# Patient Record
Sex: FEMALE | Race: WHITE | NOT HISPANIC OR LATINO | Employment: OTHER | URBAN - METROPOLITAN AREA
[De-identification: names, ages, dates, MRNs, and addresses within clinical notes are randomized per-mention and may not be internally consistent; named-entity substitution may affect disease eponyms.]

---

## 2017-03-20 ENCOUNTER — CARE COORDINATION (OUTPATIENT)
Dept: CARDIOLOGY | Facility: CLINIC | Age: 72
End: 2017-03-20

## 2017-03-20 DIAGNOSIS — R93.3 ABNORMAL FINDINGS ON DIAGNOSTIC IMAGING OF OTHER PARTS OF DIGESTIVE TRACT: ICD-10-CM

## 2017-03-20 DIAGNOSIS — I42.8 NICM (NONISCHEMIC CARDIOMYOPATHY) (H): Primary | ICD-10-CM

## 2017-03-21 ENCOUNTER — PRE VISIT (OUTPATIENT)
Dept: CARDIOLOGY | Facility: CLINIC | Age: 72
End: 2017-03-21

## 2017-03-21 DIAGNOSIS — I50.22 CHRONIC SYSTOLIC HEART FAILURE (H): Primary | ICD-10-CM

## 2017-03-22 ENCOUNTER — OFFICE VISIT (OUTPATIENT)
Dept: CARDIOLOGY | Facility: CLINIC | Age: 72
End: 2017-03-22
Attending: INTERNAL MEDICINE
Payer: MEDICARE

## 2017-03-22 ENCOUNTER — RADIANT APPOINTMENT (OUTPATIENT)
Dept: CARDIOLOGY | Facility: CLINIC | Age: 72
End: 2017-03-22

## 2017-03-22 VITALS
SYSTOLIC BLOOD PRESSURE: 131 MMHG | HEIGHT: 65 IN | HEART RATE: 79 BPM | BODY MASS INDEX: 31.42 KG/M2 | DIASTOLIC BLOOD PRESSURE: 80 MMHG | OXYGEN SATURATION: 94 % | WEIGHT: 188.6 LBS

## 2017-03-22 DIAGNOSIS — R93.3 ABNORMAL FINDINGS ON DIAGNOSTIC IMAGING OF OTHER PARTS OF DIGESTIVE TRACT: ICD-10-CM

## 2017-03-22 DIAGNOSIS — I42.8 NICM (NONISCHEMIC CARDIOMYOPATHY) (H): ICD-10-CM

## 2017-03-22 DIAGNOSIS — I42.8 NON-ISCHEMIC CARDIOMYOPATHY (H): Primary | ICD-10-CM

## 2017-03-22 DIAGNOSIS — I50.22 CHRONIC SYSTOLIC HEART FAILURE (H): ICD-10-CM

## 2017-03-22 LAB
ALBUMIN SERPL-MCNC: 3.8 G/DL (ref 3.4–5)
ALP SERPL-CCNC: 56 U/L (ref 40–150)
ALT SERPL W P-5'-P-CCNC: 21 U/L (ref 0–50)
ANION GAP SERPL CALCULATED.3IONS-SCNC: 8 MMOL/L (ref 3–14)
AST SERPL W P-5'-P-CCNC: 12 U/L (ref 0–45)
BILIRUB SERPL-MCNC: 0.5 MG/DL (ref 0.2–1.3)
BUN SERPL-MCNC: 13 MG/DL (ref 7–30)
CALCIUM SERPL-MCNC: 8.8 MG/DL (ref 8.5–10.1)
CHLORIDE SERPL-SCNC: 106 MMOL/L (ref 94–109)
CHOLEST SERPL-MCNC: 149 MG/DL
CO2 SERPL-SCNC: 29 MMOL/L (ref 20–32)
CREAT SERPL-MCNC: 0.6 MG/DL (ref 0.52–1.04)
GFR SERPL CREATININE-BSD FRML MDRD: ABNORMAL ML/MIN/1.7M2
GLUCOSE SERPL-MCNC: 143 MG/DL (ref 70–99)
HDLC SERPL-MCNC: 52 MG/DL
LDLC SERPL CALC-MCNC: 42 MG/DL
NONHDLC SERPL-MCNC: 96 MG/DL
POTASSIUM SERPL-SCNC: 4.4 MMOL/L (ref 3.4–5.3)
PROT SERPL-MCNC: 6.8 G/DL (ref 6.8–8.8)
SODIUM SERPL-SCNC: 143 MMOL/L (ref 133–144)
TRIGL SERPL-MCNC: 273 MG/DL

## 2017-03-22 PROCEDURE — 99214 OFFICE O/P EST MOD 30 MIN: CPT | Mod: GC | Performed by: INTERNAL MEDICINE

## 2017-03-22 PROCEDURE — 36415 COLL VENOUS BLD VENIPUNCTURE: CPT | Performed by: INTERNAL MEDICINE

## 2017-03-22 PROCEDURE — 80061 LIPID PANEL: CPT | Performed by: INTERNAL MEDICINE

## 2017-03-22 PROCEDURE — 80053 COMPREHEN METABOLIC PANEL: CPT | Performed by: INTERNAL MEDICINE

## 2017-03-22 PROCEDURE — 99212 OFFICE O/P EST SF 10 MIN: CPT | Mod: ZF

## 2017-03-22 ASSESSMENT — PAIN SCALES - GENERAL: PAINLEVEL: NO PAIN (0)

## 2017-03-22 NOTE — NURSING NOTE
Cardiac Testing: Patient given instructions regarding  echocardiogram . Discussed purpose, preparation, procedure and when to expect results reported back to the patient. Patient demonstrated understanding of this information and agreed to call with further questions or concerns.    Med Reconcile: Reviewed and verified all current medications with the patient. The updated medication list was printed and given to the patient.    Return Appointment: Follow up with Dr Hinojosa in 2 years with an echo Patient given instructions regarding scheduling next clinic visit. Patient demonstrated understanding of this information and agreed to call with further questions or concerns.    Patient stated she understood all health information given and agreed to call with further questions or concerns.    [facility], RN  RN Care Coordinator  [facility]  [phone]

## 2017-03-22 NOTE — NURSING NOTE
Chief Complaint   Patient presents with     Follow Up For     heart problem--71 year old female with history of chronic systolic heart failure secondary to nonischemic cardiomyopathy, hypertension, hyperlipidemia, DM type 2 and obesity presenting for follow up

## 2017-03-22 NOTE — MR AVS SNAPSHOT
After Visit Summary   3/22/2017    Afsaneh Hernandez    MRN: [id number]           Patient Information     Date Of Birth          1945        Visit Information        Provider Department      3/22/2017 4:20 PM Ifeoma Hinojosa MD [facility]        Care Instructions    You were seen today in the Cardiovascular Clinic at the [facility].     Cardiology Providers you saw during your visit: Dr Ifeoma Hinojosa    Diagnosis: Nonischemic Cardiomyopathy    Results: Dr Hinojosa reviewed the results of your lab     Recommendations:   1.  Please continue to monitor your blood pressure.  If it consistently remains greater then 140/90, please follow up with your primary care provider.  We would recommend increasing your Losartan for blood pressure control.     Follow-up: Follow up with Dr Hinojosa in 2 years with an echo.  We would be happy to see you sooner if you have any questions or concerns.       For emergencies call 911.    For any scheduling needs or nursing related questions, please call [phone].    Thank you for your visit today! If you have questions or concerns about today's visit, please call me.      [facility]on Heart RN  RN Care Coordinator  [facility]  [phone]    Press option 1 for the [place] and then 3 for nursing related questions                Follow-ups after your visit        Who to contact     If you have questions or need follow up information about today's clinic visit or your schedule please contact [facility] directly at [phone].  Normal or non-critical lab and imaging results will be communicated to you by MyChart, letter or phone within 4 business days after the clinic has received the results. If you do not hear from us within 7 days, please contact the clinic through MyChart or phone. If you have a critical or abnormal lab result, we will notify you by phone as soon as possible.  Submit refill requests through  "Michealt or call your pharmacy and they will forward the refill request to us. Please allow 3 business days for your refill to be completed.          Additional Information About Your Visit        MyChart Information     psicofxpt gives you secure access to your electronic health record. If you see a primary care provider, you can also send messages to your care team and make appointments. If you have questions, please call your primary care clinic.  If you do not have a primary care provider, please call [phone] and they will assist you.        Care EveryWhere ID     This is your Care EveryWhere ID. This could be used by other organizations to access your [place] medical records  [id number]        Your Vitals Were     Pulse Height Pulse Oximetry BMI (Body Mass Index)          79 1.651 m (5' 5\") 94% 31.38 kg/m2         Blood Pressure from Last 3 Encounters:   03/22/17 131/80   03/09/16 116/79   08/26/15 133/75    Weight from Last 3 Encounters:   03/22/17 85.5 kg (188 lb 9.6 oz)   03/09/16 84.6 kg (186 lb 8 oz)   08/26/15 89.3 kg (196 lb 14.4 oz)              Today, you had the following     No orders found for display       Primary Care Provider Office Phone # Fax #    Lanette Chavira [phone]       [facility] [address] [place], [place] [address]        Thank you!     Thank you for choosing [facility]  for your care. Our goal is always to provide you with excellent care. Hearing back from our patients is one way we can continue to improve our services. Please take a few minutes to complete the written survey that you may receive in the mail after your visit with us. Thank you!             Your Updated Medication List - Protect others around you: Learn how to safely use, store and throw away your medicines at www.disposemymeds.org.          This list is accurate as of: 3/22/17  5:13 PM.  Always use your most recent med list.                   Brand Name Dispense Instructions for " use    aspirin 81 MG EC tablet   Generic drug:  aspirin      Take 81 mg by mouth daily.       canagliflozin 300 MG tablet    INVOKANA     Take 300 mg by mouth every morning (before breakfast)       carvedilol 25 MG tablet    COREG    180 tablet    Take 1 tablet by mouth 2 times daily (with meals).       cholecalciferol 1000 UNIT tablet    vitamin D     Take 1 tablet by mouth daily.       glipiZIDE 5 MG tablet    GLUCOTROL     Take 10 mg by mouth 2 times daily (before meals).       JANUVIA PO      Take 100 mg by mouth       LIPITOR 20 MG tablet   Generic drug:  atorvastatin      Take 20 mg by mouth daily.       losartan 50 MG tablet    COZAAR     Take 50 mg by mouth daily.       metFORMIN 500 MG tablet    GLUCOPHAGE     Take 500 mg by mouth 2 times daily (with meals) Reported on 3/22/2017       multivitamin per tablet      Take 1 tablet by mouth daily.       PREMARIN VA      Place  vaginally. Apply once a week to vaginal area.       ROGAINE EX      Externally apply  topically. daily

## 2017-03-22 NOTE — LETTER
3/22/2017      RE: Afsaneh Hernandez  [address] [address]       Dear Colleague,    Thank you for the opportunity to participate in the care of your patient, Afsaneh Hernandez, at the [facility]. Please see a copy of my visit note below.    HPI:   Afsaneh Hernandez is a 71-year-old female with history of hypertension, diabetes mellitus type 2, obesity with h/o nonischemic cardiomyopathy, LVEF~30 % via echo in 2010 with improvement in the LVEF of ~50 % noted on cardiac MRI 2/2012 and echo in 2013 confirmed LVEF of 55-60%, subsequently slightly decreased to 47% on TTE 2/2015 who presents for ongoing evaluation and management.   Since her last visit in March 2016, Afsaneh has added few more but she feels feels well though. [facility] and also with a significant increase in her baseline activity at home related to caring for and walking her puppy.   She denies any chest pain, dyspnea at rest or with exertion, palpitations, PND, orthopnea, peripheral edema, LH, or syncope.  Her new TTE today was reviewed by us and showed stable LVEF without significant valvular disease.      PAST MEDICAL HISTORY:  Past Medical History:   Diagnosis Date     Bronchitis      Cardiomyopathy      Chronic systolic heart failure (H) 5/5/2015     Diabetes      Diverticulosis      Fibrocystic disease of breast      HTN (hypertension)      Hyperlipidemia      Non-ischemic cardiomyopathy (H) 5/5/2015       FAMILY HISTORY:  Family History   Problem Relation Age of Onset     C.A.D. Maternal Grandfather      C.A.D. Paternal Grandfather      Breast Cancer Sister      CANCER Sister      Cancer - colorectal Mother      CANCER Mother      Prostate Cancer Father      CANCER Father        SOCIAL HISTORY:  Social History     Social History     Marital status:      Spouse name: N/A     Number of children: N/A     Years of education: N/A     Social History  Main Topics     Smoking status: Never Smoker     Smokeless tobacco: Never Used     Alcohol use No     Drug use: No     Sexual activity: Not on file     Other Topics Concern     Not on file     Social History Narrative       CURRENT MEDICATIONS:  Current Outpatient Prescriptions   Medication Sig Dispense Refill     canagliflozin (INVOKANA) 300 MG tablet Take 300 mg by mouth every morning (before breakfast)       SitaGLIPtin Phosphate (JANUVIA PO) Take 100 mg by mouth       Omega-3 Fatty Acids (OMEGA-3 FISH OIL PO) Prn       carvedilol (COREG) 25 MG tablet Take 1 tablet by mouth 2 times daily (with meals). 180 tablet 3     Estrogens, Conjugated (PREMARIN VA) Place  vaginally. Apply once a week to vaginal area.       Minoxidil, [id number], (ROGAINE EX) Externally apply  topically. daily       aspirin (ASPIR-81) 81 MG EC tablet Take 81 mg by mouth daily.       Multiple Vitamin (MULTIVITAMIN) per tablet Take 1 tablet by mouth daily.       MetFORmin (GLUCOPHAGE) 500 MG tablet Take 1,000 mg by mouth 2 times daily (with meals).       glipiZIDE (GLUCOTROL) 5 MG tablet Take 10 mg by mouth 2 times daily (before meals).       atorvastatin (LIPITOR) 20 MG tablet Take 20 mg by mouth daily.       cholecalciferol (VITAMIN D) 1000 UNIT tablet Take 1 tablet by mouth daily.       losartan (COZAAR) 50 MG tablet Take 50 mg by mouth daily.         ROS:   Constitutional: No fever, chills, or sweats. No weight gain/loss.   ENT: No visual disturbance, ear ache, epistaxis, sore throat.   Allergies/Immunologic: Negative.   Respiratory: No cough, hemoptysis.   Cardiovascular: As per HPI.   GI: No nausea, vomiting, hematemesis, melena, or hematochezia.   : No urinary frequency, dysuria, or hematuria.   Integument: Negative.   Psychiatric: Negative.   Neuro: Negative.   Endocrinology: Negative.   Musculoskeletal: Negative.    EXAM: /80 mmHg, Pulse 79, Wt. 188lb, BMI 31kg/m2, SpO2 94%.  There were no vitals taken for this visit.  General:  appears comfortable, alert and articulate  Head: normocephalic, atraumatic  Eyes: anicteric sclera, EOMI  Neck: no adenopathy  Orophyarynx: moist mucosa, no lesions, dentition intact  Heart: regular, S1/S2, no murmur, gallop, rub, estimated JVP 7cmH2O  Lungs: clear, no rales or wheezing  Abdomen: soft, non-tender, bowel sounds present, no hepatosplenomegaly  Extremities: no clubbing, cyanosis or edema  Neurological: normal speech and affect, no gross motor deficits    Labs:  CBC RESULTS:  Lab Results   Component Value Date    WBC 8.4 01/07/2013    RBC 4.33 01/07/2013    HGB 12.4 01/07/2013    HCT 37.7 01/07/2013    MCV 87 01/07/2013    MCH 28.6 01/07/2013    MCHC 32.9 01/07/2013    RDW 13.2 01/07/2013     01/07/2013       CMP RESULTS:  Lab Results   Component Value Date     03/22/2017    POTASSIUM 4.4 03/22/2017    CHLORIDE 106 03/22/2017    CO2 29 03/22/2017    ANIONGAP 8 03/22/2017     (H) 03/22/2017    BUN 13 03/22/2017    CR 0.60 03/22/2017    GFRESTIMATED >90  Non  GFR Calc   03/22/2017    GFRESTBLACK >90   GFR Calc   03/22/2017    NISHI 8.8 03/22/2017    BILITOTAL 0.5 03/22/2017    ALBUMIN 3.8 03/22/2017    ALKPHOS 56 03/22/2017    ALT 21 03/22/2017    AST 12 03/22/2017        INR RESULTS:  No results found for: INR    Lab Results   Component Value Date    MAG 1.6 01/07/2013     No results found for: NTBNPI  Lab Results   Component Value Date    NTBNP 166 (H) 01/07/2013     TTE 3/22/2017    Interpretation Summary  Mild dilatation of the aorta is present. Ascending aorta 4.1 cm.  Mildly (EF 45-50%) reduced left ventricular function is present. Traced at  50%. Mild left ventricular dilation is present.  Right ventricular function, chamber size, wall motion, and thickness are  normal.  The inferior vena cava was normal in size with preserved respiratory  variability.   No change from last study.    Assessment and Plan:   1. Chronic systolic heart failure secondary  to NIC. Stage C. NYHA Class I. The patient was first diagnosed in 2011. LVEF has recovered improved on medical therapy (EF 55-60% in 2013) with mild decline to 47% in February 2015, stable at 50% today.   ACEi/ARB yes, losartan 50 mg daily.BB yes, carvedilol 25 mg BID  Aldosterone antagonist no   SCD prophylaxis does not meet criteria for implant  % BiV pacing: N/A  Fluid status euvolemic  NSAID use: No  Sleep Apnea Evaluation: NA  Follow-up 24 months with repeat echocardiogram    2. Hypertension:  Advised her to keep an eye on her blood pressure and Dr. Chavira, her PCP could go up on her ARB if persistently over 140/90 mmHg.  3. DM: Continue current treatment.    The patient was seen with and examined by the attending physician, Dr. Hinojosa, who agrees with the above plan.     Obdulio Bell, DO  Heart failure fellow  [phone]      I have reviewed today's vital signs, notes, medications, labs and imaging. I have also seen and examined the patient and agree with the findings and plan as outlined above.    Ifeoma Hinojosa MD  Section Head - Advanced Heart Failure, Transplantation and Mechanical Circulatory Support  Co-Director - Adult Congenital and Cardiovascular [facility]etics Center  Associate Professor of Medicine, [facility]  Patient Care Team:  Lanette Chavira as PCP - Cynthia Olmedo RN as Nurse Coordinator (Cardiology)  Ifeoma Hinojosa MD as MD (Cardiology)  SELF, REFERRED

## 2017-03-22 NOTE — PROGRESS NOTES
HPI:   Afsaneh Hernandez is a 71-year-old female with history of hypertension, diabetes mellitus type 2, obesity with h/o nonischemic cardiomyopathy, LVEF~30 % via echo in 2010 with improvement in the LVEF of ~50 % noted on cardiac MRI 2/2012 and echo in 2013 confirmed LVEF of 55-60%, subsequently slightly decreased to 47% on TTE 2/2015 who presents for ongoing evaluation and management.   Since her last visit in March 2016, Afsaneh has added few more but she feels feels well though. [facility] remains [facility] [employer] and also with a significant increase in her baseline activity at home related to caring for and walking her puppy.   She denies any chest pain, dyspnea at rest or with exertion, palpitations, PND, orthopnea, peripheral edema, LH, or syncope.  Her new TTE today was reviewed by us and showed stable LVEF without significant valvular disease.      PAST MEDICAL HISTORY:  Past Medical History:   Diagnosis Date     Bronchitis      Cardiomyopathy      Chronic systolic heart failure (H) 5/5/2015     Diabetes      Diverticulosis      Fibrocystic disease of breast      HTN (hypertension)      Hyperlipidemia      Non-ischemic cardiomyopathy (H) 5/5/2015       FAMILY HISTORY:  Family History   Problem Relation Age of Onset     C.A.D. Maternal Grandfather      C.A.D. Paternal Grandfather      Breast Cancer Sister      CANCER Sister      Cancer - colorectal Mother      CANCER Mother      Prostate Cancer Father      CANCER Father        SOCIAL HISTORY:  Social History     Social History     Marital status:      Spouse name: N/A     Number of children: N/A     Years of education: N/A     Social History Main Topics     Smoking status: Never Smoker     Smokeless tobacco: Never Used     Alcohol use No     Drug use: No     Sexual activity: Not on file     Other Topics Concern     Not on file     Social History Narrative       CURRENT MEDICATIONS:  Current Outpatient Prescriptions   Medication Sig Dispense Refill      canagliflozin (INVOKANA) 300 MG tablet Take 300 mg by mouth every morning (before breakfast)       SitaGLIPtin Phosphate (JANUVIA PO) Take 100 mg by mouth       Omega-3 Fatty Acids (OMEGA-3 FISH OIL PO) Prn       carvedilol (COREG) 25 MG tablet Take 1 tablet by mouth 2 times daily (with meals). 180 tablet 3     Estrogens, Conjugated (PREMARIN VA) Place  vaginally. Apply once a week to vaginal area.       Minoxidil, [id number], (ROGAINE EX) Externally apply  topically. daily       aspirin (ASPIR-81) 81 MG EC tablet Take 81 mg by mouth daily.       Multiple Vitamin (MULTIVITAMIN) per tablet Take 1 tablet by mouth daily.       MetFORmin (GLUCOPHAGE) 500 MG tablet Take 1,000 mg by mouth 2 times daily (with meals).       glipiZIDE (GLUCOTROL) 5 MG tablet Take 10 mg by mouth 2 times daily (before meals).       atorvastatin (LIPITOR) 20 MG tablet Take 20 mg by mouth daily.       cholecalciferol (VITAMIN D) 1000 UNIT tablet Take 1 tablet by mouth daily.       losartan (COZAAR) 50 MG tablet Take 50 mg by mouth daily.         ROS:   Constitutional: No fever, chills, or sweats. No weight gain/loss.   ENT: No visual disturbance, ear ache, epistaxis, sore throat.   Allergies/Immunologic: Negative.   Respiratory: No cough, hemoptysis.   Cardiovascular: As per HPI.   GI: No nausea, vomiting, hematemesis, melena, or hematochezia.   : No urinary frequency, dysuria, or hematuria.   Integument: Negative.   Psychiatric: Negative.   Neuro: Negative.   Endocrinology: Negative.   Musculoskeletal: Negative.    EXAM: /80 mmHg, Pulse 79, Wt. 188lb, BMI 31kg/m2, SpO2 94%.  There were no vitals taken for this visit.  General: appears comfortable, alert and articulate  Head: normocephalic, atraumatic  Eyes: anicteric sclera, EOMI  Neck: no adenopathy  Orophyarynx: moist mucosa, no lesions, dentition intact  Heart: regular, S1/S2, no murmur, gallop, rub, estimated JVP 7cmH2O  Lungs: clear, no rales or wheezing  Abdomen: soft,  non-tender, bowel sounds present, no hepatosplenomegaly  Extremities: no clubbing, cyanosis or edema  Neurological: normal speech and affect, no gross motor deficits    Labs:  CBC RESULTS:  Lab Results   Component Value Date    WBC 8.4 01/07/2013    RBC 4.33 01/07/2013    HGB 12.4 01/07/2013    HCT 37.7 01/07/2013    MCV 87 01/07/2013    MCH 28.6 01/07/2013    MCHC 32.9 01/07/2013    RDW 13.2 01/07/2013     01/07/2013       CMP RESULTS:  Lab Results   Component Value Date     03/22/2017    POTASSIUM 4.4 03/22/2017    CHLORIDE 106 03/22/2017    CO2 29 03/22/2017    ANIONGAP 8 03/22/2017     (H) 03/22/2017    BUN 13 03/22/2017    CR 0.60 03/22/2017    GFRESTIMATED >90  Non  GFR Calc   03/22/2017    GFRESTBLACK >90   GFR Calc   03/22/2017    NISHI 8.8 03/22/2017    BILITOTAL 0.5 03/22/2017    ALBUMIN 3.8 03/22/2017    ALKPHOS 56 03/22/2017    ALT 21 03/22/2017    AST 12 03/22/2017        INR RESULTS:  No results found for: INR    Lab Results   Component Value Date    MAG 1.6 01/07/2013     No results found for: NTBNPI  Lab Results   Component Value Date    NTBNP 166 (H) 01/07/2013     TTE 3/22/2017    Interpretation Summary  Mild dilatation of the aorta is present. Ascending aorta 4.1 cm.  Mildly (EF 45-50%) reduced left ventricular function is present. Traced at  50%. Mild left ventricular dilation is present.  Right ventricular function, chamber size, wall motion, and thickness are  normal.  The inferior vena cava was normal in size with preserved respiratory  variability.   No change from last study.    Assessment and Plan:   1. Chronic systolic heart failure secondary to NICM. Stage C. NYHA Class I. The patient was first diagnosed in 2011. LVEF has recovered improved on medical therapy (EF 55-60% in 2013) with mild decline to 47% in February 2015, stable at 50% today.   ACEi/ARB yes, losartan 50 mg daily.BB yes, carvedilol 25 mg BID  Aldosterone antagonist no   SCD  prophylaxis does not meet criteria for implant  % BiV pacing: N/A  Fluid status euvolemic  NSAID use: No  Sleep Apnea Evaluation: NA  Follow-up 24 months with repeat echocardiogram    2. Hypertension:  Advised her to keep an eye on her blood pressure and Dr. Chavira, her PCP could go up on her ARB if persistently over 140/90 mmHg.  3. DM: Continue current treatment.    The patient was seen with and examined by the attending physician, Dr. Hinojosa, who agrees with the above plan.     Obdulio Bell, DO  Heart failure fellow  [phone]      I have reviewed today's vital signs, notes, medications, labs and imaging. I have also seen and examined the patient and agree with the findings and plan as outlined above.    Ifeoma Hinojosa MD  Section Head - Advanced Heart Failure, Transplantation and Mechanical Circulatory Support  Co-Director - Adult Congenital and Cardiovascular Genetics Center  Associate Professor of Medicine, [facility]  Patient Care Team:  Lanette Chavira as PCP - Cynthia Olmedo RN as Nurse Coordinator (Cardiology)  Ifeoma Hinojosa MD as MD (Cardiology)  SELF, REFERRED

## 2017-03-22 NOTE — PATIENT INSTRUCTIONS
You were seen today in the Cardiovascular Clinic at the [facility].     Cardiology Providers you saw during your visit: Dr Ifeoma Hinojosa    Diagnosis: Nonischemic Cardiomyopathy    Results: Dr Hinojosa reviewed the results of your labs and echo with you in clinic today    Recommendations:   1.  Please continue to monitor your blood pressure.  If it consistently remains greater then 140/90, please follow up with your primary care provider.  We would recommend increasing your Losartan for blood pressure control.     Follow-up: Follow up with Dr Hinojosa in 2 years with an echo.  We would be happy to see you sooner if you have any questions or concerns.       For emergencies call 911.    For any scheduling needs or nursing related questions, please call [phone].    Thank you for your visit today! If you have questions or concerns about today's visit, please call me.      [facility] RN  RN Care Coordinator  [facility]  [phone]    Press option 1 for the [place] and then 3 for nursing related questions

## 2019-04-04 ENCOUNTER — TELEPHONE (OUTPATIENT)
Dept: CARDIOLOGY | Facility: CLINIC | Age: 74
End: 2019-04-04

## 2019-04-04 NOTE — TELEPHONE ENCOUNTER
Patient needs follow up with Dr. Hinojosa, ECHO and lab appointments. Stuart left message on 3/9/19, Mikayla left message on 4/4/19 and sent letter requesting patient call to schedule.

## 2019-04-04 NOTE — TELEPHONE ENCOUNTER
----- Message from Carlos Willard CMA sent at 4/3/2019 11:09 AM CDT -----  Regarding: FW: CASSIUS Hinojosa  I tried calling patient, was just going to make the appointment but number just rang continuously. She needs a return HF appointment with Dr. Hinojosa.        ----- Message -----  From: Stuart Patel  Sent: 3/29/2019  10:01 AM  To: Carlos Willard CMA  Subject: CASSIUS Hinojosa                                       Hi,    Found this follow-up in Dr. Hinojosa's recall list. Let me know if I can help getting her scheduled in any way.    Thanks!    Stuart

## 2019-04-08 NOTE — TELEPHONE ENCOUNTER
[facility] Call Center    Phone Message    May a detailed message be left on voicemail: yes    Reason for Call: Other: Pt returned missed call to speak with Mikayla. Please give her a call back.      Action Taken: Message routed to:  [facility] Cardiology

## 2019-04-09 NOTE — TELEPHONE ENCOUNTER
[facility] Call Center    Phone Message    May a detailed message be left on voicemail: yes    Reason for Call: Other: cardiology     Action Taken: Message routed to:  [facility] cardiology

## 2019-04-12 ENCOUNTER — TELEPHONE (OUTPATIENT)
Dept: CARDIOLOGY | Facility: CLINIC | Age: 74
End: 2019-04-12

## 2019-04-12 DIAGNOSIS — E78.2 MIXED HYPERLIPIDEMIA: ICD-10-CM

## 2019-04-12 DIAGNOSIS — I50.22 CHRONIC SYSTOLIC HEART FAILURE (H): Primary | ICD-10-CM

## 2019-04-12 NOTE — TELEPHONE ENCOUNTER
[facility] Call [facility]    Phone Message    May a detailed message be left on voicemail: yes    Reason for Call: Other: Pt calling to schedule follow up with Dr. Hinojosa. She states she needed an echo and labs done prior. No lab orders are in pts chart. Please submit.     Action Taken: Message routed to:  [facility]

## 2019-04-24 ENCOUNTER — ANCILLARY PROCEDURE (OUTPATIENT)
Dept: CARDIOLOGY | Facility: CLINIC | Age: 74
End: 2019-04-24
Attending: INTERNAL MEDICINE
Payer: COMMERCIAL

## 2019-04-24 ENCOUNTER — OFFICE VISIT (OUTPATIENT)
Dept: CARDIOLOGY | Facility: CLINIC | Age: 74
End: 2019-04-24
Attending: INTERNAL MEDICINE
Payer: MEDICARE

## 2019-04-24 ENCOUNTER — DOCUMENTATION ONLY (OUTPATIENT)
Dept: CARE COORDINATION | Facility: CLINIC | Age: 74
End: 2019-04-24

## 2019-04-24 VITALS
HEART RATE: 78 BPM | OXYGEN SATURATION: 96 % | DIASTOLIC BLOOD PRESSURE: 73 MMHG | HEIGHT: 64 IN | BODY MASS INDEX: 31.47 KG/M2 | WEIGHT: 184.3 LBS | SYSTOLIC BLOOD PRESSURE: 115 MMHG

## 2019-04-24 DIAGNOSIS — I50.22 CHRONIC SYSTOLIC HEART FAILURE (H): Primary | ICD-10-CM

## 2019-04-24 DIAGNOSIS — I42.8 NON-ISCHEMIC CARDIOMYOPATHY (H): ICD-10-CM

## 2019-04-24 DIAGNOSIS — E78.2 MIXED HYPERLIPIDEMIA: ICD-10-CM

## 2019-04-24 DIAGNOSIS — I50.22 CHRONIC SYSTOLIC HEART FAILURE (H): ICD-10-CM

## 2019-04-24 LAB
ALBUMIN SERPL-MCNC: 3.7 G/DL (ref 3.4–5)
ALP SERPL-CCNC: 64 U/L (ref 40–150)
ALT SERPL W P-5'-P-CCNC: 20 U/L (ref 0–50)
ANION GAP SERPL CALCULATED.3IONS-SCNC: 7 MMOL/L (ref 3–14)
AST SERPL W P-5'-P-CCNC: 11 U/L (ref 0–45)
BILIRUB SERPL-MCNC: 0.6 MG/DL (ref 0.2–1.3)
BUN SERPL-MCNC: 16 MG/DL (ref 7–30)
CALCIUM SERPL-MCNC: 9.2 MG/DL (ref 8.5–10.1)
CHLORIDE SERPL-SCNC: 107 MMOL/L (ref 94–109)
CHOLEST SERPL-MCNC: 149 MG/DL
CO2 SERPL-SCNC: 25 MMOL/L (ref 20–32)
CREAT SERPL-MCNC: 0.69 MG/DL (ref 0.52–1.04)
GFR SERPL CREATININE-BSD FRML MDRD: 85 ML/MIN/{1.73_M2}
GLUCOSE SERPL-MCNC: 187 MG/DL (ref 70–99)
HDLC SERPL-MCNC: 53 MG/DL
LDLC SERPL CALC-MCNC: 43 MG/DL
NONHDLC SERPL-MCNC: 95 MG/DL
POTASSIUM SERPL-SCNC: 4.5 MMOL/L (ref 3.4–5.3)
PROT SERPL-MCNC: 6.7 G/DL (ref 6.8–8.8)
SODIUM SERPL-SCNC: 139 MMOL/L (ref 133–144)
TRIGL SERPL-MCNC: 260 MG/DL

## 2019-04-24 PROCEDURE — 99214 OFFICE O/P EST MOD 30 MIN: CPT | Mod: ZP | Performed by: INTERNAL MEDICINE

## 2019-04-24 PROCEDURE — 80053 COMPREHEN METABOLIC PANEL: CPT | Performed by: INTERNAL MEDICINE

## 2019-04-24 PROCEDURE — G0463 HOSPITAL OUTPT CLINIC VISIT: HCPCS | Mod: ZF

## 2019-04-24 PROCEDURE — 36415 COLL VENOUS BLD VENIPUNCTURE: CPT | Performed by: INTERNAL MEDICINE

## 2019-04-24 PROCEDURE — 80061 LIPID PANEL: CPT | Performed by: INTERNAL MEDICINE

## 2019-04-24 RX ORDER — DAPAGLIFLOZIN 10 MG/1
10 TABLET, FILM COATED ORAL DAILY
COMMUNITY

## 2019-04-24 ASSESSMENT — PAIN SCALES - GENERAL: PAINLEVEL: NO PAIN (0)

## 2019-04-24 ASSESSMENT — MIFFLIN-ST. JEOR: SCORE: 1315.98

## 2019-04-24 NOTE — NURSING NOTE
Chief Complaint   Patient presents with     Follow Up      RTN HF: 74 year old female presents with NICM, EF 50% for 2 year follow up with labs and echo prior     Vitals were taken and medications were reconciled.     Johanny Thompson RMA  1:26 PM

## 2019-04-24 NOTE — PROGRESS NOTES
HPI:  74 year-old female with history of hypertension, diabetes mellitus type 2, obesity with h/o nonischemic cardiomyopathy, LVEF~30 % via echo in 2010 with improvement in the LVEF of ~50 % who presents for ongoing evaluation and management.  Since last clinic visit patient reports from a cardiac standpoint she has been feeling well.  She denies any chest pain or pressure, sob/hernandez, orthopnea, pnd, palpitations, syncope/presyncope, viet or significant decrease in exercise capacity.  She denies any problems with her current cardiac medications and reports compliance.        PAST MEDICAL HISTORY:  Past Medical History:   Diagnosis Date     Bronchitis      Cardiomyopathy      Chronic systolic heart failure (H) 5/5/2015     Diabetes      Diverticulosis      Fibrocystic disease of breast      HTN (hypertension)      Hyperlipidemia      Non-ischemic cardiomyopathy (H) 5/5/2015       FAMILY HISTORY:  Family History   Problem Relation Age of Onset     C.A.D. Maternal Grandfather      C.A.D. Paternal Grandfather      Breast Cancer Sister      Cancer Sister      Cancer - colorectal Mother      Cancer Mother      Prostate Cancer Father      Cancer Father        SOCIAL HISTORY:  Social History     Social History     Marital status:      Spouse name: N/A     Number of children: N/A     Years of education: N/A     Social History Main Topics     Smoking status: Never Smoker     Smokeless tobacco: Never Used     Alcohol use No     Drug use: No     Sexual activity: Not on file     Other Topics Concern     Not on file     Social History Narrative       CURRENT MEDICATIONS:  Current Outpatient Medications   Medication Sig Dispense Refill     aspirin (ASPIR-81) 81 MG EC tablet Take 81 mg by mouth daily.       atorvastatin (LIPITOR) 20 MG tablet Take 20 mg by mouth daily.       carvedilol (COREG) 25 MG tablet Take 1 tablet by mouth 2 times daily (with meals). 180 tablet 3     cholecalciferol (VITAMIN D) 1000 UNIT tablet Take 1  "tablet by mouth daily.       dapagliflozin (FARXIGA) 10 MG TABS tablet Take 10 mg by mouth daily       Estrogens, Conjugated (PREMARIN VA) Place  vaginally. Apply once a week to vaginal area.       glipiZIDE (GLUCOTROL) 5 MG tablet Take 10 mg by mouth 2 times daily (before meals).       losartan (COZAAR) 50 MG tablet Take 50 mg by mouth daily.       MetFORmin (GLUCOPHAGE) 500 MG tablet Take 500 mg by mouth 2 times daily (with meals) Reported on 3/22/2017       Minoxidil, [id number], (ROGAINE EX) Externally apply  topically. daily       Multiple Vitamin (MULTIVITAMIN) per tablet Take 1 tablet by mouth daily.       SitaGLIPtin Phosphate (JANUVIA PO) Take 100 mg by mouth       canagliflozin (INVOKANA) 300 MG tablet Take 300 mg by mouth every morning (before breakfast)         ROS:   Constitutional: No fever, chills, or sweats. No weight gain/loss.   ENT: No visual disturbance, ear ache, epistaxis, sore throat.   Allergies/Immunologic: Negative.   Respiratory: No cough, hemoptysis.   Cardiovascular: As per HPI.   GI: No nausea, vomiting, hematemesis, melena, or hematochezia.   : No urinary frequency, dysuria, or hematuria.   Integument: Negative.   Psychiatric: Negative.   Neuro: Negative.   Endocrinology: Negative.   Musculoskeletal: Negative.    EXAM:   /73 (BP Location: Left arm, Patient Position: Chair, Cuff Size: Adult Large)   Pulse 78   Ht 1.626 m (5' 4\")   Wt 83.6 kg (184 lb 4.8 oz)   SpO2 96%   BMI 31.64 kg/m    General: appears comfortable, alert and articulate  Head: normocephalic, atraumatic  Eyes: anicteric sclera, EOMI  Neck: no adenopathy  Orophyarynx: moist mucosa, no lesions, dentition intact  Heart: regular, S1/S2, no murmur, gallop, rub, estimated JVP 7-8cm without HJR  Lungs: clear, no rales or wheezing  Abdomen: soft, non-tender, bowel sounds present, no hepatomegaly  Extremities: no clubbing, cyanosis or edema  Neurological: normal speech and affect, no gross motor " deficits    Labs:  CBC RESULTS:  Lab Results   Component Value Date    WBC 8.4 01/07/2013    RBC 4.33 01/07/2013    HGB 12.4 01/07/2013    HCT 37.7 01/07/2013    MCV 87 01/07/2013    MCH 28.6 01/07/2013    MCHC 32.9 01/07/2013    RDW 13.2 01/07/2013     01/07/2013       CMP RESULTS:  Lab Results   Component Value Date     04/24/2019    POTASSIUM 4.5 04/24/2019    CHLORIDE 107 04/24/2019    CO2 25 04/24/2019    ANIONGAP 7 04/24/2019     (H) 04/24/2019    BUN 16 04/24/2019    CR 0.69 04/24/2019    GFRESTIMATED 85 04/24/2019    GFRESTBLACK >90 04/24/2019    NISHI 9.2 04/24/2019    BILITOTAL 0.6 04/24/2019    ALBUMIN 3.7 04/24/2019    ALKPHOS 64 04/24/2019    ALT 20 04/24/2019    AST 11 04/24/2019      TTE 3/22/2017  Interpretation Summary  Mild dilatation of the aorta is present. Ascending aorta 4.1cm.  Mildly (EF 45-50%) reduced left ventricular function is present. Traced at 50%. Mild left ventricular dilation is present.  Right ventricular function, chamber size, wall motion, and thickness are normal.  The inferior vena cava was normal in size with preserved respiratory variability.   No change from last study.      Echo today reviewed by me:  Interpretation Summary  Borderline (EF 50-55%) reduced left ventricular function is present.  Global right ventricular function is normal.  Trileaflet aortic sclerosis without stenosis.  The inferior vena cava was normal in size with preserved respiratory variability.  No pericardial effusion is present.      Assessment and Plan:  74 year-old female with history of hypertension, diabetes mellitus type 2, obesity with h/o nonischemic cardiomyopathy presents for ongoing evaluation and management  1. Chronic systolic heart failure secondary to NICM. Stage C. NYHA Class I. The patient was first diagnosed in 2011. LVEF has significantly improved on medical therapy.  LVEF stable at 50% today.   ACEi/ARB yes, continue losartan 50 mg daily.  BB yes,  continuecarvedilol 25 mg BID  Aldosterone antagonist not indicated as LVEF > 35%   SCD prophylaxis does not meet criteria for implant as LVEF > 35%   % BiV pacing: N/A  Fluid status euvolemic  NSAID use: No    2. Hypertension:  Well controlled. Followed by PCP. Would increase losartan if BP persistently over 135/85 mmHg.    3. DM: Followed by PCP.      Follow-up:  2 years with an echocardiogram and labs.  Will be happy to see sooner if change in clinical status or new questions/concerns arise.      Ifeoma Hinojosa MD  Section Head - Advanced Heart Failure, Transplantation and Mechanical Circulatory Support  Director - Adult [facility]en[facility]  Associate Professor of Medicine, [facility]  Patient Care Team:  Lanette Chavira as PCP - General  Ifeoma Hinojosa MD as MD (Cardiology)  Mikayla Freitas, RN as Specialty Care Coordinator (Cardiology)  SELF, REFERRED

## 2019-04-24 NOTE — NURSING NOTE
Diet: Patient instructed regarding a heart failure healthy diet, including discussion of reduced fat and 2000 mg daily sodium restriction, daily weights, medication purpose and compliance, fluid restrictions and resources for patient and family to access for assistance with heart failure management.       Labs: Patient was given results of the laboratory testing obtained today and patient was instructed about when to return for the next laboratory testing.     Med Reconcile: Reviewed and verified all current medications with the patient. The updated medication list was printed and given to the patient. No medication changes.    Return Appointment: Patient given instructions regarding scheduling next clinic visit. RTC in 2 years with echo prior    Patient stated she understood all health information given and agreed to call with further questions or concerns.     Mikayla Freitas RN

## 2019-04-24 NOTE — PATIENT INSTRUCTIONS
Cardiology Providers you saw during your visit:  Dr. Hinojosa     Medication changes:  1- No medication changes today.     Follow up:  1- Return to see Dr. Hinojosa in 2 years with an echocardiogram prior.     We would be happy to see you prior to that if needed.        Results for ARIC GARCIA (MRN [id number]) as of 4/24/2019 13:20   Ref. Range 4/24/2019 12:41   Sodium Latest Ref Range: 133 - 144 mmol/L 139   Potassium Latest Ref Range: 3.4 - 5.3 mmol/L 4.5   Chloride Latest Ref Range: 94 - 109 mmol/L 107   Carbon Dioxide Latest Ref Range: 20 - 32 mmol/L 25   Urea Nitrogen Latest Ref Range: 7 - 30 mg/dL 16   Creatinine Latest Ref Range: 0.52 - 1.04 mg/dL 0.69   GFR Estimate Latest Ref Range: >60 mL/min/1.73_m2 85   GFR Estimate If Black Latest Ref Range: >60 mL/min/1.73_m2 >90   Calcium Latest Ref Range: 8.5 - 10.1 mg/dL 9.2   Anion Gap Latest Ref Range: 3 - 14 mmol/L 7   Albumin Latest Ref Range: 3.4 - 5.0 g/dL 3.7   Protein Total Latest Ref Range: 6.8 - 8.8 g/dL 6.7 (L)   Bilirubin Total Latest Ref Range: 0.2 - 1.3 mg/dL 0.6   Alkaline Phosphatase Latest Ref Range: 40 - 150 U/L 64   ALT Latest Ref Range: 0 - 50 U/L 20   AST Latest Ref Range: 0 - 45 U/L 11   Cholesterol Latest Ref Range: <200 mg/dL 149   HDL Cholesterol Latest Ref Range: >49 mg/dL 53   LDL Cholesterol Calculated Latest Ref Range: <100 mg/dL 43   Non HDL Cholesterol Latest Ref Range: <130 mg/dL 95   Triglycerides Latest Ref Range: <150 mg/dL 260 (H)   Glucose Latest Ref Range: 70 - 99 mg/dL 187 (H)       Please call if you have:  1. Weight gain of more than 2 pounds in a day or 5 pounds in a week  2. Increased shortness of breath, swelling or bloating  3. Dizziness, lightheadedness   4. Any questions or concerns.      Follow the American Heart Association Diet and Lifestyle recommendations:  Limit saturated fat, trans fat, sodium, red meat, sweets and sugar-sweetened beverages. If you choose to eat red meat, compare labels and select the leanest  cuts available.  Aim for at least 150 minutes of moderate physical activity or 75 minutes of vigorous physical activity - or an equal combination of both - each week.     During business hours: [phone], press option # 1 to be routed to the [facility] then option # 3 for medical questions to speak with a nurse     After hours, weekends or holidays: On Call Cardiologist- [phone]   option #4 and ask to speak to the on-call Cardiologist. Inform them you are a CORE/heart failure patient at the [facility].     Mikayla Freitas, RN BSN  Cardiology Nurse Care Coordinator     Keep up the good work!     Take Care!

## 2019-04-24 NOTE — LETTER
4/24/2019      RE: Afsaneh Hernandez  [address] [facility] [facility] [address]       Dear Colleague,    Thank you for the opportunity to participate in the care of your patient, Afsaneh Hernandez, at the [facility] HEART [facility]. Please see a copy of my visit note below.    HPI:  74 year-old female with history of hypertension, diabetes mellitus type 2, obesity with h/o nonischemic cardiomyopathy, LVEF~30 % via echo in 2010 with improvement in the LVEF of ~50 % who presents for ongoing evaluation and management.  Since last clinic visit patient reports from a cardiac standpoint she has been feeling well.  She denies any chest pain or pressure, sob/hernandez, orthopnea, pnd, palpitations, syncope/presyncope, viet or significant decrease in exercise capacity.  She denies any problems with her current cardiac medications and reports compliance.        PAST MEDICAL HISTORY:  Past Medical History:   Diagnosis Date     Bronchitis      Cardiomyopathy      Chronic systolic heart failure (H) 5/5/2015     Diabetes      Diverticulosis      Fibrocystic disease of breast      HTN (hypertension)      Hyperlipidemia      Non-ischemic cardiomyopathy (H) 5/5/2015       FAMILY HISTORY:  Family History   Problem Relation Age of Onset     C.A.D. Maternal Grandfather      C.A.D. Paternal Grandfather      Breast Cancer Sister      Cancer Sister      Cancer - colorectal Mother      Cancer Mother      Prostate Cancer Father      Cancer Father        SOCIAL HISTORY:  Social History     Social History     Marital status:      Spouse name: N/A     Number of children: N/A     Years of education: N/A     Social History Main Topics     Smoking status: Never Smoker     Smokeless tobacco: Never Used     Alcohol use No     Drug use: No     Sexual activity: Not on file     Other Topics Concern     Not on file     Social History Narrative       CURRENT MEDICATIONS:  Current Outpatient Medications   Medication Sig  "Dispense Refill     aspirin (ASPIR-81) 81 MG EC tablet Take 81 mg by mouth daily.       atorvastatin (LIPITOR) 20 MG tablet Take 20 mg by mouth daily.       carvedilol (COREG) 25 MG tablet Take 1 tablet by mouth 2 times daily (with meals). 180 tablet 3     cholecalciferol (VITAMIN D) 1000 UNIT tablet Take 1 tablet by mouth daily.       dapagliflozin (FARXIGA) 10 MG TABS tablet Take 10 mg by mouth daily       Estrogens, Conjugated (PREMARIN VA) Place  vaginally. Apply once a week to vaginal area.       glipiZIDE (GLUCOTROL) 5 MG tablet Take 10 mg by mouth 2 times daily (before meals).       losartan (COZAAR) 50 MG tablet Take 50 mg by mouth daily.       MetFORmin (GLUCOPHAGE) 500 MG tablet Take 500 mg by mouth 2 times daily (with meals) Reported on 3/22/2017       Minoxidil, [id number], (ROGAINE EX) Externally apply  topically. daily       Multiple Vitamin (MULTIVITAMIN) per tablet Take 1 tablet by mouth daily.       SitaGLIPtin Phosphate (JANUVIA PO) Take 100 mg by mouth       canagliflozin (INVOKANA) 300 MG tablet Take 300 mg by mouth every morning (before breakfast)         ROS:   Constitutional: No fever, chills, or sweats. No weight gain/loss.   ENT: No visual disturbance, ear ache, epistaxis, sore throat.   Allergies/Immunologic: Negative.   Respiratory: No cough, hemoptysis.   Cardiovascular: As per HPI.   GI: No nausea, vomiting, hematemesis, melena, or hematochezia.   : No urinary frequency, dysuria, or hematuria.   Integument: Negative.   Psychiatric: Negative.   Neuro: Negative.   Endocrinology: Negative.   Musculoskeletal: Negative.    EXAM:   /73 (BP Location: Left arm, Patient Position: Chair, Cuff Size: Adult Large)   Pulse 78   Ht 1.626 m (5' 4\")   Wt 83.6 kg (184 lb 4.8 oz)   SpO2 96%   BMI 31.64 kg/m     General: appears comfortable, alert and articulate  Head: normocephalic, atraumatic  Eyes: anicteric sclera, EOMI  Neck: no adenopathy  Orophyarynx: moist mucosa, no lesions, dentition " intact  Heart: regular, S1/S2, no murmur, gallop, rub, estimated JVP 7-8cm without HJR  Lungs: clear, no rales or wheezing  Abdomen: soft, non-tender, bowel sounds present, no hepatomegaly  Extremities: no clubbing, cyanosis or edema  Neurological: normal speech and affect, no gross motor deficits    Labs:  CBC RESULTS:  Lab Results   Component Value Date    WBC 8.4 01/07/2013    RBC 4.33 01/07/2013    HGB 12.4 01/07/2013    HCT 37.7 01/07/2013    MCV 87 01/07/2013    MCH 28.6 01/07/2013    MCHC 32.9 01/07/2013    RDW 13.2 01/07/2013     01/07/2013       CMP RESULTS:  Lab Results   Component Value Date     04/24/2019    POTASSIUM 4.5 04/24/2019    CHLORIDE 107 04/24/2019    CO2 25 04/24/2019    ANIONGAP 7 04/24/2019     (H) 04/24/2019    BUN 16 04/24/2019    CR 0.69 04/24/2019    GFRESTIMATED 85 04/24/2019    GFRESTBLACK >90 04/24/2019    NISHI 9.2 04/24/2019    BILITOTAL 0.6 04/24/2019    ALBUMIN 3.7 04/24/2019    ALKPHOS 64 04/24/2019    ALT 20 04/24/2019    AST 11 04/24/2019      TTE 3/22/2017  Interpretation Summary  Mild dilatation of the aorta is present. Ascending aorta 4.1cm.  Mildly (EF 45-50%) reduced left ventricular function is present. Traced at 50%. Mild left ventricular dilation is present.  Right ventricular function, chamber size, wall motion, and thickness are normal.  The inferior vena cava was normal in size with preserved respiratory variability.   No change from last study.      Echo today reviewed by me:  Interpretation Summary  Borderline (EF 50-55%) reduced left ventricular function is present.  Global right ventricular function is normal.  Trileaflet aortic sclerosis without stenosis.  The inferior vena cava was normal in size with preserved respiratory variability.  No pericardial effusion is present.      Assessment and Plan:  74 year-old female with history of hypertension, diabetes mellitus type 2, obesity with h/o nonischemic cardiomyopathy presents for ongoing  evaluation and management  1. Chronic systolic heart failure secondary to NICM. Stage C. NYHA Class I. The patient was first diagnosed in 2011. LVEF has significantly improved on medical therapy.  LVEF stable at 50% today.   ACEi/ARB yes, continue losartan 50 mg daily.  BB yes, continuecarvedilol 25 mg BID  Aldosterone antagonist not indicated as LVEF > 35%   SCD prophylaxis does not meet criteria for implant as LVEF > 35%   % BiV pacing: N/A  Fluid status euvolemic  NSAID use: No    2. Hypertension:  Well controlled. Followed by PCP. Would increase losartan if BP persistently over 135/85 mmHg.    3. DM: Followed by PCP.      Follow-up:  2 years with an echocardiogram and labs.  Will be happy to see sooner if change in clinical status or new questions/concerns arise.      Ifeoma Hinojosa MD  Section Head - Advanced Heart Failure, Transplantation and Mechanical Circulatory Support  Director - Adult Congenital and Cardiovascular Genetics Center  Associate Professor of Medicine, [facility]  Patient Care Team:  Lanette Chavira as PCP - General  Ifeoma Hinojosa MD as MD (Cardiology)  Mikayla Freitas RN as Specialty Care Coordinator (Cardiology)  SELF, REFERRED

## 2019-09-29 ENCOUNTER — HEALTH MAINTENANCE LETTER (OUTPATIENT)
Age: 74
End: 2019-09-29

## 2019-10-28 ENCOUNTER — HEALTH MAINTENANCE LETTER (OUTPATIENT)
Age: 74
End: 2019-10-28

## 2020-03-15 ENCOUNTER — HEALTH MAINTENANCE LETTER (OUTPATIENT)
Age: 75
End: 2020-03-15

## 2020-12-28 ENCOUNTER — TELEPHONE (OUTPATIENT)
Dept: CARDIOLOGY | Facility: CLINIC | Age: 75
End: 2020-12-28

## 2020-12-28 NOTE — TELEPHONE ENCOUNTER
Tried calling for pt to schedule a follow up with Dr. Hinojosa with an echo prior for late April 2021.    Please link orders when appt is scheduled.

## 2021-01-15 ENCOUNTER — HEALTH MAINTENANCE LETTER (OUTPATIENT)
Age: 76
End: 2021-01-15

## 2021-02-10 DIAGNOSIS — E78.1 HYPERTRIGLYCERIDEMIA: ICD-10-CM

## 2021-02-10 DIAGNOSIS — I42.8 NON-ISCHEMIC CARDIOMYOPATHY (H): Primary | ICD-10-CM

## 2021-02-17 ENCOUNTER — OFFICE VISIT (OUTPATIENT)
Dept: CARDIOLOGY | Facility: CLINIC | Age: 76
End: 2021-02-17
Attending: FAMILY MEDICINE
Payer: MEDICARE

## 2021-02-17 ENCOUNTER — ANCILLARY PROCEDURE (OUTPATIENT)
Dept: CARDIOLOGY | Facility: CLINIC | Age: 76
End: 2021-02-17
Payer: COMMERCIAL

## 2021-02-17 VITALS
WEIGHT: 181 LBS | BODY MASS INDEX: 30.16 KG/M2 | HEIGHT: 65 IN | OXYGEN SATURATION: 96 % | DIASTOLIC BLOOD PRESSURE: 66 MMHG | HEART RATE: 77 BPM | SYSTOLIC BLOOD PRESSURE: 106 MMHG

## 2021-02-17 DIAGNOSIS — I50.22 CHRONIC SYSTOLIC HEART FAILURE (H): ICD-10-CM

## 2021-02-17 DIAGNOSIS — I42.8 NON-ISCHEMIC CARDIOMYOPATHY (H): ICD-10-CM

## 2021-02-17 DIAGNOSIS — E78.1 HYPERTRIGLYCERIDEMIA: ICD-10-CM

## 2021-02-17 LAB
ALBUMIN SERPL-MCNC: 3.8 G/DL (ref 3.4–5)
ALP SERPL-CCNC: 58 U/L (ref 40–150)
ALT SERPL W P-5'-P-CCNC: 20 U/L (ref 0–50)
ANION GAP SERPL CALCULATED.3IONS-SCNC: 6 MMOL/L (ref 3–14)
AST SERPL W P-5'-P-CCNC: 10 U/L (ref 0–45)
BILIRUB SERPL-MCNC: 0.5 MG/DL (ref 0.2–1.3)
BUN SERPL-MCNC: 15 MG/DL (ref 7–30)
CALCIUM SERPL-MCNC: 8.7 MG/DL (ref 8.5–10.1)
CHLORIDE SERPL-SCNC: 109 MMOL/L (ref 94–109)
CHOLEST SERPL-MCNC: 147 MG/DL
CO2 SERPL-SCNC: 27 MMOL/L (ref 20–32)
CREAT SERPL-MCNC: 0.56 MG/DL (ref 0.52–1.04)
GFR SERPL CREATININE-BSD FRML MDRD: 90 ML/MIN/{1.73_M2}
GLUCOSE SERPL-MCNC: 161 MG/DL (ref 70–99)
HDLC SERPL-MCNC: 52 MG/DL
LDLC SERPL CALC-MCNC: 65 MG/DL
NONHDLC SERPL-MCNC: 95 MG/DL
POTASSIUM SERPL-SCNC: 4.2 MMOL/L (ref 3.4–5.3)
PROT SERPL-MCNC: 6.6 G/DL (ref 6.8–8.8)
SODIUM SERPL-SCNC: 142 MMOL/L (ref 133–144)
TRIGL SERPL-MCNC: 150 MG/DL

## 2021-02-17 PROCEDURE — G0463 HOSPITAL OUTPT CLINIC VISIT: HCPCS | Mod: 25

## 2021-02-17 PROCEDURE — 93306 TTE W/DOPPLER COMPLETE: CPT | Performed by: INTERNAL MEDICINE

## 2021-02-17 PROCEDURE — 80061 LIPID PANEL: CPT | Performed by: PATHOLOGY

## 2021-02-17 PROCEDURE — 93005 ELECTROCARDIOGRAM TRACING: CPT

## 2021-02-17 PROCEDURE — 80053 COMPREHEN METABOLIC PANEL: CPT | Performed by: PATHOLOGY

## 2021-02-17 PROCEDURE — 36415 COLL VENOUS BLD VENIPUNCTURE: CPT | Performed by: PATHOLOGY

## 2021-02-17 PROCEDURE — 99214 OFFICE O/P EST MOD 30 MIN: CPT | Mod: 25 | Performed by: INTERNAL MEDICINE

## 2021-02-17 ASSESSMENT — PAIN SCALES - GENERAL: PAINLEVEL: NO PAIN (0)

## 2021-02-17 ASSESSMENT — MIFFLIN-ST. JEOR: SCORE: 1311.89

## 2021-02-17 NOTE — PATIENT INSTRUCTIONS
Cardiology Providers you saw during your visit:  Dr. Hinojosa     Medication changes:  1- No changes     Follow up:  1- Return to see DR. Hinojosa in 4 years with labs, EKG and echocardiogram prior.     Please call if you have:  1. Weight gain of more than 2 pounds in a day or 5 pounds in a week  2. Increased shortness of breath, swelling or bloating  3. Dizziness, lightheadedness   4. Any questions or concerns.      Follow the American Heart Association Diet and Lifestyle recommendations:  Limit saturated fat, trans fat, sodium, red meat, sweets and sugar-sweetened beverages. If you choose to eat red meat, compare labels and select the leanest cuts available.  Aim for at least 150 minutes of moderate physical activity or 75 minutes of vigorous physical activity - or an equal combination of both - each week.     During business hours: [phone], press option # 1 to be routed to the [facility] then option # 4 to send a message to your care team     After hours, weekends or holidays: On Call Cardiologist- [phone]   option #4 and ask to speak to the on-call Cardiologist. Inform them you are a CORE/heart failure patient at the [facility].     Mikayla Sampson, RN BSN  Cardiology Nurse Care Coordinator     Keep up the good work!     Take Care!

## 2021-02-17 NOTE — NURSING NOTE
Chief Complaint   Patient presents with     Follow Up     75 year old female presents with NICM, EF 50% for 2 year follow up with labs and echo prior      Vitals were taken and medications reconciled.     Juan Luis Washburn CMA  10:03 AM

## 2021-02-17 NOTE — NURSING NOTE
Diet: Patient instructed regarding a heart failure healthy diet, including discussion of reduced fat and 2000 mg daily sodium restriction, daily weights, medication purpose and compliance, fluid restrictions and resources for patient and family to access for assistance with heart failure management.       Labs: Patient was given results of the laboratory testing obtained today and patient was instructed about when to return for the next laboratory testing.     Med Reconcile: Reviewed and verified all current medications with the patient. The updated medication list was printed and given to the patient. NO CHANGES.     Return Appointment: Patient given instructions regarding scheduling next clinic visit. RTC in 4 years with labs, ekg and echo prior to Dr. Hinojosa.    Patient stated she understood all health information given and agreed to call with further questions or concerns.     Mikayla Sampson RN

## 2021-02-17 NOTE — PROGRESS NOTES
Heart Failure Clinic Follow up:  February 17, 2021    HPI:  Ms Hernandez is a 74 year-old female with history of hypertension, diabetes mellitus type 2, obesity with h/o nonischemic cardiomyopathy, LVEF~30 % via echo in 2010 with improvement in the LVEF of ~50 % who presents for ongoing evaluation and management.      The patient was last seen in clinic in April 2019. Since last clinic visit patient reports feeling well from a cardiac standpoint.  She denies any chest pain or pressure, sob/hernandez, orthopnea, pnd, palpitations, syncope/presyncope, viet or significant decrease in exercise capacity.  She denies any problems with her current cardiac medications and reports compliance. Current cardiac meds include ASA 81 mg, Lipitor 20 mg, carvedilol 25 mg BID, losartan 50 mg daily, dapagliflozin, glipizide/metformin/januvia for DM.    TTE done earlier today revealed normal LV size, borderline stable function- LVEF 51% based on biplane 2D tracing. Normal right ventricular function, chamber size, wall motion, and thickness. No significant valvular abnormalities. No overall interval change since TTE done in 2019.      PAST MEDICAL HISTORY:  Past Medical History:   Diagnosis Date     Bronchitis      Cardiomyopathy      Chronic systolic heart failure (H) 5/5/2015     Diabetes      Diverticulosis      Fibrocystic disease of breast      HTN (hypertension)      Hyperlipidemia      Non-ischemic cardiomyopathy (H) 5/5/2015       FAMILY HISTORY:  Family History   Problem Relation Age of Onset     C.A.D. Maternal Grandfather      C.A.D. Paternal Grandfather      Breast Cancer Sister      Cancer Sister      Cancer - colorectal Mother      Cancer Mother      Prostate Cancer Father      Cancer Father        SOCIAL HISTORY:  Social History     Social History     Marital status:      Spouse name: N/A     Number of children: N/A     Years of education: N/A     Social History Main Topics     Smoking status: Never Smoker     Smokeless  tobacco: Never Used     Alcohol use No     Drug use: No     Sexual activity: Not on file     Other Topics Concern     Not on file     Social History Narrative       CURRENT MEDICATIONS:  Current Outpatient Medications   Medication Sig Dispense Refill     aspirin (ASPIR-81) 81 MG EC tablet Take 81 mg by mouth daily.       atorvastatin (LIPITOR) 20 MG tablet Take 20 mg by mouth daily.       carvedilol (COREG) 25 MG tablet Take 1 tablet by mouth 2 times daily (with meals). 180 tablet 3     cholecalciferol (VITAMIN D) 1000 UNIT tablet Take 1 tablet by mouth daily.       dapagliflozin (FARXIGA) 10 MG TABS tablet Take 10 mg by mouth daily       Estrogens, Conjugated (PREMARIN VA) Place  vaginally. Apply once a week to vaginal area.       glipiZIDE (GLUCOTROL) 5 MG tablet Take 10 mg by mouth 2 times daily (before meals).       losartan (COZAAR) 50 MG tablet Take 50 mg by mouth daily.       MetFORmin (GLUCOPHAGE) 500 MG tablet Take 500 mg by mouth 2 times daily (with meals) Reported on 3/22/2017       Minoxidil, 1303294803, (ROGAINE EX) Externally apply  topically. daily       Multiple Vitamin (MULTIVITAMIN) per tablet Take 1 tablet by mouth daily.       Multiple Vitamins-Minerals (PRESERVISION AREDS 2 PO) Take by mouth daily       SitaGLIPtin Phosphate (JANUVIA PO) Take 100 mg by mouth       canagliflozin (INVOKANA) 300 MG tablet Take 300 mg by mouth every morning (before breakfast)         ROS:   Constitutional: No fever, chills, or sweats. No weight gain/loss.   ENT: No visual disturbance, ear ache, epistaxis, sore throat.   Allergies/Immunologic: Negative.   Respiratory: No cough, hemoptysis.   Cardiovascular: As per HPI.   GI: No nausea, vomiting, hematemesis, melena, or hematochezia.   : No urinary frequency, dysuria, or hematuria.   Integument: Negative.   Psychiatric: Negative.   Neuro: Negative.   Endocrinology: Negative.   Musculoskeletal: Negative.    EXAM:   /66 (BP Location: Right arm, Patient Position:  "Chair, Cuff Size: Adult Large)   Pulse 77   Ht 1.651 m (5' 5\")   Wt 82.1 kg (181 lb)   SpO2 96%   BMI 30.12 kg/m    General: appears comfortable, alert and articulate  Head: normocephalic, atraumatic  Eyes: anicteric sclera, EOMI  Neck: no adenopathy  Orophyarynx: moist mucosa, no lesions, dentition intact  Heart: regular, S1/S2, no murmur, gallop, rub, estimated JVP 7-8cm without HJR  Lungs: clear, no rales or wheezing  Abdomen: soft, non-tender, bowel sounds present, no hepatomegaly  Extremities: no clubbing, cyanosis or edema  Neurological: normal speech and affect, no gross motor deficits    Labs:  CBC RESULTS:  Lab Results   Component Value Date    WBC 8.4 01/07/2013    RBC 4.33 01/07/2013    HGB 12.4 01/07/2013    HCT 37.7 01/07/2013    MCV 87 01/07/2013    MCH 28.6 01/07/2013    MCHC 32.9 01/07/2013    RDW 13.2 01/07/2013     01/07/2013       CMP RESULTS:  Lab Results   Component Value Date     02/17/2021    POTASSIUM 4.2 02/17/2021    CHLORIDE 109 02/17/2021    CO2 27 02/17/2021    ANIONGAP 6 02/17/2021     (H) 02/17/2021    BUN 15 02/17/2021    CR 0.56 02/17/2021    GFRESTIMATED 90 02/17/2021    GFRESTBLACK >90 02/17/2021    NISHI 8.7 02/17/2021    BILITOTAL 0.5 02/17/2021    ALBUMIN 3.8 02/17/2021    ALKPHOS 58 02/17/2021    ALT 20 02/17/2021    AST 10 02/17/2021      TTE 3/22/2017  Interpretation Summary  Mild dilatation of the aorta is present. Ascending aorta 4.1cm.  Mildly (EF 45-50%) reduced left ventricular function is present. Traced at 50%. Mild left ventricular dilation is present.  Right ventricular function, chamber size, wall motion, and thickness are normal.  The inferior vena cava was normal in size with preserved respiratory variability.   No change from last study.    TTE today:  Interpretation Summary  Borderline (EF 50-55%) left ventricular function is present. LVEF 51% based on biplane 2D tracing. Normal right ventricular function, chamber size, wall motion, and " thickness.   No significant valvular abnormalities.   Trileaflet aortic sclerosis without stenosis.  The inferior vena cava was normal in size with preserved respiratory variability.  No pericardial effusion is present.    EKG today:  NSR, 1st degree AV delay, no significant ST segment changes.     Assessment and Plan:      Mrs Hernandez is a 74 year-old female with history of hypertension, diabetes mellitus type 2, obesity with h/o nonischemic cardiomyopathy presents for ongoing evaluation and management    1. Chronic systolic heart failure secondary to NICM. Stage C. NYHA Class I. The patient was first diagnosed in 2011. LVEF has significantly improved on medical therapy.  LVEF stable at 50% today.     ACEi/ARB: yes, continue losartan 50 mg daily.  BB: yes, continue carvedilol 25 mg BID.  Aldosterone antagonist: not indicated as LVEF > 35%   SCD prophylaxis: does not meet criteria for implant as LVEF > 35%   % BiV pacing: N/A  Fluid status euvolemic  NSAID use: No    2. Hypertension:  Well controlled. Followed by PCP.     3. DM: Followed by PCP. HbA1c 7.5% checked last month.     Follow-up:  4 years with an echocardiogram and labs.  Will be happy to see sooner if change in clinical status or new questions/concerns arise.    Pilar Barrett MD,   Cardiovascular Disease Fellow  Pager [phone]      I have reviewed today's vital signs, notes, medications, labs and imaging. I have also seen and examined the patient and agree with the findings and plan as outlined above.    Ifeoma Hinojosa MD  Section Head - Advanced Heart Failure, Transplantation and Mechanical Circulatory Support  Director - [facility]  Associate Professor of Medicine, [facility]    I spent 35 minutes in care of the patient today including reviewing today's echo, EKG and  labs examination and discussion of testing results and care recommendations with patient and documentation.

## 2021-02-18 LAB — INTERPRETATION ECG - MUSE: NORMAL

## 2021-05-09 ENCOUNTER — HEALTH MAINTENANCE LETTER (OUTPATIENT)
Age: 76
End: 2021-05-09

## 2021-08-29 ENCOUNTER — HEALTH MAINTENANCE LETTER (OUTPATIENT)
Age: 76
End: 2021-08-29

## 2021-10-24 ENCOUNTER — HEALTH MAINTENANCE LETTER (OUTPATIENT)
Age: 76
End: 2021-10-24

## 2022-06-05 ENCOUNTER — HEALTH MAINTENANCE LETTER (OUTPATIENT)
Age: 77
End: 2022-06-05

## 2022-10-15 ENCOUNTER — HEALTH MAINTENANCE LETTER (OUTPATIENT)
Age: 77
End: 2022-10-15

## 2023-08-20 ENCOUNTER — HEALTH MAINTENANCE LETTER (OUTPATIENT)
Age: 78
End: 2023-08-20

## 2023-11-16 ENCOUNTER — OFFICE VISIT (OUTPATIENT)
Dept: CARDIOLOGY | Facility: CLINIC | Age: 78
End: 2023-11-16
Payer: COMMERCIAL

## 2023-11-16 VITALS
BODY MASS INDEX: 30.35 KG/M2 | WEIGHT: 182.2 LBS | DIASTOLIC BLOOD PRESSURE: 73 MMHG | HEART RATE: 89 BPM | SYSTOLIC BLOOD PRESSURE: 116 MMHG | OXYGEN SATURATION: 97 % | TEMPERATURE: 98.3 F | RESPIRATION RATE: 16 BRPM | HEIGHT: 65 IN

## 2023-11-16 DIAGNOSIS — I42.8 NONISCHEMIC CARDIOMYOPATHY (H): Primary | ICD-10-CM

## 2023-11-16 PROCEDURE — 99204 OFFICE O/P NEW MOD 45 MIN: CPT | Performed by: INTERNAL MEDICINE

## 2023-11-16 RX ORDER — HYDROXYZINE HYDROCHLORIDE 25 MG/1
TABLET, FILM COATED ORAL
COMMUNITY

## 2023-11-16 RX ORDER — HYDROCORTISONE 2.5 %
CREAM (GRAM) TOPICAL
COMMUNITY
Start: 2023-10-23 | End: 2023-11-16

## 2023-11-16 NOTE — LETTER
"11/16/2023    Lanette Chavira  [address] [place] [address]    RE: Afsaneh Hernandez       Dear Colleague,     I had the pleasure of seeing Afsaneh Hernandez in the [facility].    HEART CARE ENCOUNTER CONSULTATON NOTE      [facility]  [phone]      Assessment/Recommendations   Assessment:  1.  Nonischemic cardiomyopathy: LVEF improved from 30% initially now to 50%.  Complains of decreased exercise tolerance and will repeat echocardiogram this year.  2.  Hypertension: Well-controlled  3.  Diabetes mellitus type 2  4.  Hypertension: Well-controlled    Plan:  1.  Repeat echocardiogram  2.  Continue current medical management  Follow-up in 1 year       History of Present Illness/Subjective    HPI: Afsaneh Hernandez is a 78 year old female with history of mild nonischemic cardiomyopathy, diabetes mellitus type 2, hypertension, dyslipidemia who I am seeing today to establish care.  She last saw her cardiologist a couple years ago and would like to establish care with a cardiologist closer to home.  When she was initially diagnosed years ago she reports that she did have an angiogram done which was unremarkable.  LVEF initially 30% now improved to 50% on last echo done in 2021.  She has noticed that she has increased shortness of breath with exertion with decreased exercise tolerance.  Denies any chest pain.  She is a retired  and has a grandkids.  Her first  passed away and her second  was diagnosed with Alzheimer's dementia and is now in a memory care center.  This has been very stressful for her.       Physical Examination  Review of Systems   Vitals: /73 (BP Location: Left arm, Patient Position: Sitting, Cuff Size: Adult Large)   Pulse 89   Temp 98.3  F (36.8  C) (Oral)   Resp 16   Ht 1.651 m (5' 5\")   Wt 82.6 kg (182 lb 3.2 oz)   SpO2 97%   BMI 30.32 kg/m    BMI= Body mass index is 30.32 kg/m .  Wt Readings from Last 3 Encounters: "   11/16/23 82.6 kg (182 lb 3.2 oz)   02/17/21 82.1 kg (181 lb)   04/24/19 83.6 kg (184 lb 4.8 oz)       General Appearance:   no distress, normal body habitus   ENT/Mouth: membranes moist, no oral lesions or bleeding gums.      EYES:  no scleral icterus, normal conjunctivae   Neck: no carotid bruits or thyromegaly   Chest/Lungs:   lungs are clear to auscultation   Cardiovascular:   Regular. Normal first and second heart sounds with no murmur trace edema right ankle   Abdomen:  ; bowel sounds are present   Extremities: no cyanosis or clubbing   Skin: no xanthelasma, warm.    Neurologic: normal  bilateral, no tremors     Psychiatric: alert and oriented x3, calm        Please refer above for cardiac ROS details.        Medical History  Surgical History Family History Social History   Past Medical History:   Diagnosis Date    Bronchitis     Cardiomyopathy     Chronic systolic heart failure (H) 5/5/2015    Diabetes     Diverticulosis     Fibrocystic disease of breast     HTN (hypertension)     Hyperlipidemia     Non-ischemic cardiomyopathy (H) 5/5/2015     No past surgical history on file.  Family History   Problem Relation Age of Onset    C.A.D. Maternal Grandfather     C.A.D. Paternal Grandfather     Breast Cancer Sister     Cancer Sister     Cancer - colorectal Mother     Cancer Mother     Prostate Cancer Father     Cancer Father         Social History     Socioeconomic History    Marital status:      Spouse name: Not on file    Number of children: Not on file    Years of education: Not on file    Highest education level: Not on file   Occupational History    Not on file   Tobacco Use    Smoking status: Never    Smokeless tobacco: Never   Vaping Use    Vaping Use: Never used   Substance and Sexual Activity    Alcohol use: No    Drug use: No    Sexual activity: Not Currently     Partners: Male   Other Topics Concern    Parent/sibling w/ CABG, MI or angioplasty before 65F 55M? Not Asked   Social History  Narrative    Not on file     Social Determinants of Health     Financial Resource Strain: Not on file   Food Insecurity: Not on file   Transportation Needs: Not on file   Physical Activity: Not on file   Stress: Not on file   Social Connections: Not on file   Interpersonal Safety: Not on file   Housing Stability: Not on file           Medications  Allergies   Current Outpatient Medications   Medication Sig Dispense Refill    aspirin (ASPIR-81) 81 MG EC tablet Take 81 mg by mouth daily.      atorvastatin (LIPITOR) 20 MG tablet Take 20 mg by mouth daily.      carvedilol (COREG) 25 MG tablet Take 1 tablet by mouth 2 times daily (with meals). 180 tablet 3    cholecalciferol (VITAMIN D) 1000 UNIT tablet Take 1 tablet by mouth daily.      dapagliflozin (FARXIGA) 10 MG TABS tablet Take 10 mg by mouth daily      glipiZIDE (GLUCOTROL) 5 MG tablet Take 10 mg by mouth 2 times daily (before meals).      hydrOXYzine (ATARAX) 25 MG tablet TAKE 1 TABLET BY MOUTH AT BEDTIME AS NEEDED FOR ANXIETY OR SLEEP      losartan (COZAAR) 50 MG tablet Take 50 mg by mouth daily.      MetFORmin (GLUCOPHAGE) 500 MG tablet Take 500 mg by mouth 2 times daily (with meals) Reported on 3/22/2017      Minoxidil, [id number], (ROGAINE EX) Externally apply  topically. daily      Multiple Vitamin (MULTIVITAMIN) per tablet Take 1 tablet by mouth daily.      Multiple Vitamins-Minerals (PRESERVISION AREDS 2 PO) Take by mouth daily      SitaGLIPtin Phosphate (JANUVIA PO) Take 100 mg by mouth         Allergies   Allergen Reactions    Prozac [Fluoxetine] Other (See Comments)    Lisinopril Other (See Comments)     cough    Sertraline Anxiety          Lab Results    Chemistry/lipid CBC Cardiac Enzymes/BNP/TSH/INR   Recent Labs   Lab Test 02/17/21  0909   CHOL 147   HDL 52   LDL 65   TRIG 150*     Recent Labs   Lab Test 02/17/21  0909 04/24/19  1241 03/22/17  1415   LDL 65 43 42     Recent Labs   Lab Test 02/17/21  0909      POTASSIUM 4.2   CHLORIDE 109   CO2  "27   *   BUN 15   CR 0.56   GFRESTIMATED 90   NISHI 8.7     Recent Labs   Lab Test 02/17/21  0909 04/24/19  1241 03/22/17  1415   CR 0.56 0.69 0.60     No results for input(s): \"A1C\" in the last 49547 hours.       No results for input(s): \"WBC\", \"HGB\", \"HCT\", \"MCV\", \"PLT\" in the last 57037 hours.  No results for input(s): \"HGB\" in the last 62257 hours. No results for input(s): \"TROPONINI\" in the last 93045 hours.  No results for input(s): \"BNP\", \"NTBNPI\", \"NTBNP\" in the last 71645 hours.  No results for input(s): \"TSH\" in the last 64126 hours.  No results for input(s): \"INR\" in the last 96640 hours.     Xiomy Patten MD       Thank you for allowing me to participate in the care of your patient.    Sincerely,   Xiomy Patten MD   [facility]  cc: No referring provider defined for this encounter.  "

## 2023-11-16 NOTE — PROGRESS NOTES
"  HEART CARE ENCOUNTER CONSULTATON NOTE      [facility]  [phone]      Assessment/Recommendations   Assessment:  1.  Nonischemic cardiomyopathy: LVEF improved from 30% initially now to 50%.  Complains of decreased exercise tolerance and will repeat echocardiogram this year.  2.  Hypertension: Well-controlled  3.  Diabetes mellitus type 2  4.  Hypertension: Well-controlled    Plan:  1.  Repeat echocardiogram  2.  Continue current medical management  Follow-up in 1 year       History of Present Illness/Subjective    HPI: Afsaneh Hernandez is a 78 year old female with history of mild nonischemic cardiomyopathy, diabetes mellitus type 2, hypertension, dyslipidemia who I am seeing today to establish care.  She last saw her cardiologist a couple years ago and would like to establish care with a cardiologist closer to home.  When she was initially diagnosed years ago she reports that she did have an angiogram done which was unremarkable.  LVEF initially 30% now improved to 50% on last echo done in 2021.  She has noticed that she has increased shortness of breath with exertion with decreased exercise tolerance.  Denies any chest pain.  She is a retired  and has a grandkids.  Her first  passed away and her second  was diagnosed with Alzheimer's dementia and is now in a memory care center.  This has been very stressful for her.       Physical Examination  Review of Systems   Vitals: /73 (BP Location: Left arm, Patient Position: Sitting, Cuff Size: Adult Large)   Pulse 89   Temp 98.3  F (36.8  C) (Oral)   Resp 16   Ht 1.651 m (5' 5\")   Wt 82.6 kg (182 lb 3.2 oz)   SpO2 97%   BMI 30.32 kg/m    BMI= Body mass index is 30.32 kg/m .  Wt Readings from Last 3 Encounters:   11/16/23 82.6 kg (182 lb 3.2 oz)   02/17/21 82.1 kg (181 lb)   04/24/19 83.6 kg (184 lb 4.8 oz)       General Appearance:   no distress, normal body habitus   ENT/Mouth: membranes moist, no oral lesions or " bleeding gums.      EYES:  no scleral icterus, normal conjunctivae   Neck: no carotid bruits or thyromegaly   Chest/Lungs:   lungs are clear to auscultation   Cardiovascular:   Regular. Normal first and second heart sounds with no murmur trace edema right ankle   Abdomen:  ; bowel sounds are present   Extremities: no cyanosis or clubbing   Skin: no xanthelasma, warm.    Neurologic: normal  bilateral, no tremors     Psychiatric: alert and oriented x3, calm        Please refer above for cardiac ROS details.        Medical History  Surgical History Family History Social History   Past Medical History:   Diagnosis Date    Bronchitis     Cardiomyopathy     Chronic systolic heart failure (H) 5/5/2015    Diabetes     Diverticulosis     Fibrocystic disease of breast     HTN (hypertension)     Hyperlipidemia     Non-ischemic cardiomyopathy (H) 5/5/2015     No past surgical history on file.  Family History   Problem Relation Age of Onset    C.A.D. Maternal Grandfather     C.A.D. Paternal Grandfather     Breast Cancer Sister     Cancer Sister     Cancer - colorectal Mother     Cancer Mother     Prostate Cancer Father     Cancer Father         Social History     Socioeconomic History    Marital status:      Spouse name: Not on file    Number of children: Not on file    Years of education: Not on file    Highest education level: Not on file   Occupational History    Not on file   Tobacco Use    Smoking status: Never    Smokeless tobacco: Never   Vaping Use    Vaping Use: Never used   Substance and Sexual Activity    Alcohol use: No    Drug use: No    Sexual activity: Not Currently     Partners: Male   Other Topics Concern    Parent/sibling w/ CABG, MI or angioplasty before 65F 55M? Not Asked   Social History Narrative    Not on file     Social Determinants of Health     Financial Resource Strain: Not on file   Food Insecurity: Not on file   Transportation Needs: Not on file   Physical Activity: Not on file   Stress:  "Not on file   Social Connections: Not on file   Interpersonal Safety: Not on file   Housing Stability: Not on file           Medications  Allergies   Current Outpatient Medications   Medication Sig Dispense Refill    aspirin (ASPIR-81) 81 MG EC tablet Take 81 mg by mouth daily.      atorvastatin (LIPITOR) 20 MG tablet Take 20 mg by mouth daily.      carvedilol (COREG) 25 MG tablet Take 1 tablet by mouth 2 times daily (with meals). 180 tablet 3    cholecalciferol (VITAMIN D) 1000 UNIT tablet Take 1 tablet by mouth daily.      dapagliflozin (FARXIGA) 10 MG TABS tablet Take 10 mg by mouth daily      glipiZIDE (GLUCOTROL) 5 MG tablet Take 10 mg by mouth 2 times daily (before meals).      hydrOXYzine (ATARAX) 25 MG tablet TAKE 1 TABLET BY MOUTH AT BEDTIME AS NEEDED FOR ANXIETY OR SLEEP      losartan (COZAAR) 50 MG tablet Take 50 mg by mouth daily.      MetFORmin (GLUCOPHAGE) 500 MG tablet Take 500 mg by mouth 2 times daily (with meals) Reported on 3/22/2017      Minoxidil, [id number], (ROGAINE EX) Externally apply  topically. daily      Multiple Vitamin (MULTIVITAMIN) per tablet Take 1 tablet by mouth daily.      Multiple Vitamins-Minerals (PRESERVISION AREDS 2 PO) Take by mouth daily      SitaGLIPtin Phosphate (JANUVIA PO) Take 100 mg by mouth         Allergies   Allergen Reactions    Prozac [Fluoxetine] Other (See Comments)    Lisinopril Other (See Comments)     cough    Sertraline Anxiety          Lab Results    Chemistry/lipid CBC Cardiac Enzymes/BNP/TSH/INR   Recent Labs   Lab Test 02/17/21  0909   CHOL 147   HDL 52   LDL 65   TRIG 150*     Recent Labs   Lab Test 02/17/21  0909 04/24/19  1241 03/22/17  1415   LDL 65 43 42     Recent Labs   Lab Test 02/17/21  0909      POTASSIUM 4.2   CHLORIDE 109   CO2 27   *   BUN 15   CR 0.56   GFRESTIMATED 90   NISHI 8.7     Recent Labs   Lab Test 02/17/21  0909 04/24/19  1241 03/22/17  1415   CR 0.56 0.69 0.60     No results for input(s): \"A1C\" in the last 72000 " "hours.       No results for input(s): \"WBC\", \"HGB\", \"HCT\", \"MCV\", \"PLT\" in the last 30081 hours.  No results for input(s): \"HGB\" in the last 07011 hours. No results for input(s): \"TROPONINI\" in the last 40228 hours.  No results for input(s): \"BNP\", \"NTBNPI\", \"NTBNP\" in the last 49433 hours.  No results for input(s): \"TSH\" in the last 34471 hours.  No results for input(s): \"INR\" in the last 44600 hours.     Xiomy Patten MD                                      "

## 2023-12-08 ENCOUNTER — HOSPITAL ENCOUNTER (OUTPATIENT)
Dept: CARDIOLOGY | Facility: CLINIC | Age: 78
Discharge: HOME | End: 2023-12-08
Attending: INTERNAL MEDICINE | Admitting: INTERNAL MEDICINE
Payer: MEDICARE

## 2023-12-08 DIAGNOSIS — I42.8 NONISCHEMIC CARDIOMYOPATHY (H): ICD-10-CM

## 2023-12-08 LAB — LVEF ECHO: NORMAL

## 2023-12-08 PROCEDURE — 93306 TTE W/DOPPLER COMPLETE: CPT

## 2023-12-08 PROCEDURE — 93306 TTE W/DOPPLER COMPLETE: CPT | Mod: 26 | Performed by: INTERNAL MEDICINE

## 2024-01-22 ENCOUNTER — OFFICE VISIT (OUTPATIENT)
Dept: CARDIOLOGY | Facility: CLINIC | Age: 79
End: 2024-01-22
Payer: COMMERCIAL

## 2024-01-22 ENCOUNTER — APPOINTMENT (OUTPATIENT)
Dept: CARDIOLOGY | Facility: CLINIC | Age: 79
End: 2024-01-22
Payer: COMMERCIAL

## 2024-01-22 VITALS
HEART RATE: 68 BPM | HEIGHT: 63 IN | BODY MASS INDEX: 32.27 KG/M2 | SYSTOLIC BLOOD PRESSURE: 104 MMHG | DIASTOLIC BLOOD PRESSURE: 56 MMHG | RESPIRATION RATE: 16 BRPM | WEIGHT: 182.1 LBS

## 2024-01-22 DIAGNOSIS — E08.69 DIABETES MELLITUS DUE TO UNDERLYING CONDITION WITH OTHER SPECIFIED COMPLICATION, WITHOUT LONG-TERM CURRENT USE OF INSULIN (H): ICD-10-CM

## 2024-01-22 DIAGNOSIS — I42.8 NON-ISCHEMIC CARDIOMYOPATHY (H): ICD-10-CM

## 2024-01-22 DIAGNOSIS — I50.20 HFREF (HEART FAILURE WITH REDUCED EJECTION FRACTION) (H): Primary | ICD-10-CM

## 2024-01-22 DIAGNOSIS — I10 PRIMARY HYPERTENSION: ICD-10-CM

## 2024-01-22 DIAGNOSIS — E66.09 CLASS 1 OBESITY DUE TO EXCESS CALORIES WITH SERIOUS COMORBIDITY AND BODY MASS INDEX (BMI) OF 32.0 TO 32.9 IN ADULT: ICD-10-CM

## 2024-01-22 DIAGNOSIS — E11.9 TYPE 2 DIABETES MELLITUS WITHOUT COMPLICATION, WITHOUT LONG-TERM CURRENT USE OF INSULIN (H): ICD-10-CM

## 2024-01-22 DIAGNOSIS — E66.811 CLASS 1 OBESITY DUE TO EXCESS CALORIES WITH SERIOUS COMORBIDITY AND BODY MASS INDEX (BMI) OF 32.0 TO 32.9 IN ADULT: ICD-10-CM

## 2024-01-22 PROCEDURE — 99215 OFFICE O/P EST HI 40 MIN: CPT | Performed by: NURSE PRACTITIONER

## 2024-01-22 RX ORDER — SITAGLIPTIN 100 MG/1
100 TABLET, FILM COATED ORAL
COMMUNITY
Start: 2023-12-09

## 2024-01-22 RX ORDER — FLUOCINOLONE ACETONIDE 0.11 MG/ML
5 OIL AURICULAR (OTIC) 2 TIMES DAILY
COMMUNITY

## 2024-01-22 RX ORDER — GLIPIZIDE 10 MG/1
10 TABLET, FILM COATED, EXTENDED RELEASE ORAL
COMMUNITY
Start: 2023-11-20

## 2024-01-22 RX ORDER — ATORVASTATIN CALCIUM 40 MG/1
40 TABLET, FILM COATED ORAL
COMMUNITY
Start: 2023-12-20

## 2024-01-22 NOTE — PROGRESS NOTES
Assessment/Recommendations   Assessment:    1. Nonischemic cardiomyopathy, heart failure with mildly reduced ejection fraction, ejection fraction 45-50%, NYHA class II: Compensated.  She has mild fatigue and dyspnea on exertion.  Her weight is stable.  We discussed the results of her echocardiogram.  2. Hypertension: Controlled.  Blood pressure 104/56.  She has occasional lightheadedness when standing too quickly.  3.  Diabetes mellitus type 2: Most recent hemoglobin A1c 7.2.  She continues Januvia, glipizide, metformin and Farxiga  4.  Obesity: BMI 32.26.  She is wanting to try to lose weight and if there is any programs to help her with this    Plan:  1.  Continue current medications.  Unable to titrate due to hypotension  2.  Monitor weights and try to follow a low-salt diet  3.  Referral for nutritionist to help with weight loss  4.  Encourage regular exercise    Afsaneh Hernandez will follow up with Dr Patten in November and in the heart failure clinic in 6 months or sooner if needed.     History of Present Illness/Subjective    Ms. Afsaneh Hernandez is a 78 year old female seen at [facility] heart failure clinic today for continued follow-up.  She follows up for heart failure with mildly reduced ejection fraction, nonischemic cardiomyopathy.  She had an echocardiogram December 8, 2023 which showed ejection fraction of 45 to 50% with moderate mitral regurgitation.  She has a past medical history significant for hypertension, diabetes mellitus type 2, obesity, hyperlipidemia, anxiety.    Today, she has mild fatigue, weakness, lightheadedness and dyspnea on exertion.  She denies shortness of breath, orthopnea, PND, palpitations, chest pain, abdominal fullness/bloating, and lower extremity edema.      She is monitoring home weights which are stable.  She tries to follow a low sodium diet.  She has been going through a lot of stress lately.  Her significant other moved into memory care last fall for his  "Alzheimer's.  She recently had a niece passed away.      ECHOCARDIOGRAM: 12/8/2023-reviewed  Interpretation Summary     1.Left ventricular function is decreased. The ejection fraction is 45-50%  (mildly reduced).  2.There is mild concentric left ventricular hypertrophy.  3.Normal right ventricle size and systolic function.  4.There is mild mitral annular calcification. There is moderate (2+) mitral  regurgitation. ERO is 0.07 cm2 with a volume of 11 mL.  5.Ascending Aorta dilatation is present, moderate  Compared to the prior study dated 2/17/2021, the LV EF is slightly lower.     Physical Examination Review of Systems   /56 (BP Location: Left arm, Patient Position: Sitting, Cuff Size: Adult Large)   Pulse 68   Resp 16   Ht 1.6 m (5' 3\")   Wt 82.6 kg (182 lb 1.6 oz)   BMI 32.26 kg/m    Body mass index is 32.26 kg/m .  Wt Readings from Last 3 Encounters:   01/22/24 82.6 kg (182 lb 1.6 oz)   11/16/23 82.6 kg (182 lb 3.2 oz)   02/17/21 82.1 kg (181 lb)       General Appearance:   no acute distress   ENT/Mouth: No abnormalities   EYES:  no scleral icterus, normal conjunctivae   Neck: no thyromegaly   Chest/Lungs:   lungs are clear to auscultation, no rales or wheezing, equal chest wall expansion    Cardiovascular:   Regular. Normal first and second heart sounds with no murmurs, rubs, or gallops, no edema bilaterally    Abdomen:  bowel sounds are present   Extremities: no cyanosis or clubbing   Skin: warm   Neurologic: no tremors     Psychiatric: alert and oriented x3    Enc Vitals  BP: 104/56  Pulse: 68  Resp: 16  Weight: 82.6 kg (182 lb 1.6 oz)  Height: 160 cm (5' 3\")                                         Medical History  Surgical History Family History Social History   Past Medical History:   Diagnosis Date    Bronchitis     Cardiomyopathy     Chronic systolic heart failure (H) 05/05/2015    Congestive heart failure (H)     Diabetes     Diverticulosis     Fibrocystic disease of breast     HFrEF (heart " failure with reduced ejection fraction) (H) 01/22/2024    HTN (hypertension)     Hyperlipidemia     Non-ischemic cardiomyopathy (H) 05/05/2015    Obese     History reviewed. No pertinent surgical history. Family History   Problem Relation Age of Onset    C.A.D. Maternal Grandfather     C.A.D. Paternal Grandfather     Breast Cancer Sister     Cancer Sister     Cancer - colorectal Mother     Cancer Mother     Prostate Cancer Father     Cancer Father     Social History     Socioeconomic History    Marital status:      Spouse name: Not on file    Number of children: Not on file    Years of education: Not on file    Highest education level: Not on file   Occupational History    Not on file   Tobacco Use    Smoking status: Never    Smokeless tobacco: Never   Vaping Use    Vaping Use: Never used   Substance and Sexual Activity    Alcohol use: No    Drug use: No    Sexual activity: Not Currently     Partners: Male   Other Topics Concern    Parent/sibling w/ CABG, MI or angioplasty before 65F 55M? Not Asked   Social History Narrative    Not on file     Social Determinants of Health     Financial Resource Strain: Not on file   Food Insecurity: Not on file   Transportation Needs: Not on file   Physical Activity: Not on file   Stress: Not on file   Social Connections: Not on file   Interpersonal Safety: Not on file   Housing Stability: Not on file          Medications  Allergies   Current Outpatient Medications   Medication Sig Dispense Refill    aspirin (ASPIR-81) 81 MG EC tablet Take 81 mg by mouth daily.      atorvastatin (LIPITOR) 40 MG tablet Take 40 mg by mouth daily at 2 pm      carvedilol (COREG) 25 MG tablet Take 1 tablet by mouth 2 times daily (with meals). 180 tablet 3    cholecalciferol (VITAMIN D) 1000 UNIT tablet Take 1 tablet by mouth daily.      dapagliflozin (FARXIGA) 10 MG TABS tablet Take 10 mg by mouth daily      fluocinolone acetonide oil 0.01 % ear drops Place 5 drops into both ears 2 times daily       glipiZIDE (GLUCOTROL XL) 10 MG 24 hr tablet Take 10 mg by mouth 2 times daily (before meals)      hydrOXYzine (ATARAX) 25 MG tablet TAKE 1 TABLET BY MOUTH AT BEDTIME AS NEEDED FOR ANXIETY OR SLEEP      JANUVIA 100 MG tablet Take 100 mg by mouth daily at 2 pm      losartan (COZAAR) 50 MG tablet Take 50 mg by mouth daily.      MetFORmin (GLUCOPHAGE) 500 MG tablet Take 1,000 mg by mouth 2 times daily (with meals) Reported on 3/22/2017      Minoxidil, [id number], (ROGAINE EX) Externally apply  topically. daily      Multiple Vitamin (MULTIVITAMIN) per tablet Take 1 tablet by mouth daily.      Multiple Vitamins-Minerals (PRESERVISION AREDS 2 PO) Take by mouth daily      Allergies   Allergen Reactions    Prozac [Fluoxetine] Other (See Comments)    Lisinopril Other (See Comments)     cough    Sertraline Anxiety         Lab Results    Chemistry/lipid CBC Cardiac Enzymes/BNP/TSH/INR   Lab Results   Component Value Date    CHOL 147 02/17/2021    HDL 52 02/17/2021    TRIG 150 (H) 02/17/2021    BUN 15 02/17/2021     02/17/2021    CO2 27 02/17/2021    Lab Results   Component Value Date    WBC 8.4 01/07/2013    HGB 12.4 01/07/2013    HCT 37.7 01/07/2013    MCV 87 01/07/2013     01/07/2013    Lab Results   Component Value Date    TSH 2.77 01/25/2011             This note has been dictated using voice recognition software. Any grammatical, typographical, or context distortions are unintentional and inherent to the software    40 minutes spent on the date of encounter doing chart review, review of outside records, review of test results, interpretation with above tests, patient visit, and documentation.

## 2024-01-22 NOTE — PATIENT INSTRUCTIONS
Afsaneh Hernandez,    It was a pleasure to see you today at [facility].     My recommendations after this visit include:  - Please follow up with Dr Patten in November   - Please follow up with Sarah Fallon in 6 months or sooner if needed  - Continue current medications  - My nurses number is [phone]  - I have placed a nutrition referral. They will call you to make an appt. If you don't hear from a representative within 2 business days, please call [phone]     Sarah Fallon, CNP

## 2024-01-22 NOTE — LETTER
1/22/2024    Lanette Chavira  [address] [place] [address]    RE: Afsaneh Hernandez       Dear Colleague,     I had the pleasure of seeing Afsaneh Hernandez in the [facility].        Assessment/Recommendations   Assessment:    1. Nonischemic cardiomyopathy, heart failure with mildly reduced ejection fraction, ejection fraction 45-50%, NYHA class II: Compensated.  She has mild fatigue and dyspnea on exertion.  Her weight is stable.  We discussed the results of her echocardiogram.  2. Hypertension: Controlled.  Blood pressure 104/56.  She has occasional lightheadedness when standing too quickly.  3.  Diabetes mellitus type 2: Most recent hemoglobin A1c 7.2.  She continues Januvia, glipizide, metformin and Farxiga  4.  Obesity: BMI 32.26.  She is wanting to try to lose weight and if there is any programs to help her with this    Plan:  1.  Continue current medications.  Unable to titrate due to hypotension  2.  Monitor weights and try to follow a low-salt diet  3.  Referral for nutritionist to help with weight loss  4.  Encourage regular exercise    Afsaneh Hernandez will follow up with Dr Patten in November and in the heart failure clinic in 6 months or sooner if needed.     History of Present Illness/Subjective    Ms. Afsaneh Hernandez is a 78 year old female seen at [facility] heart failure clinic today for continued follow-up.  She follows up for heart failure with mildly reduced ejection fraction, nonischemic cardiomyopathy.  She had an echocardiogram December 8, 2023 which showed ejection fraction of 45 to 50% with moderate mitral regurgitation.  She has a past medical history significant for hypertension, diabetes mellitus type 2, obesity, hyperlipidemia, anxiety.    Today, she has mild fatigue, weakness, lightheadedness and dyspnea on exertion.  She denies shortness of breath, orthopnea, PND, palpitations, chest pain, abdominal fullness/bloating, and lower extremity edema.      She  "is monitoring home weights which are stable.  She tries to follow a low sodium diet.  She has been going through a lot of stress lately.  Her significant other moved into memory care last fall for his Alzheimer's.  She recently had a niece passed away.      ECHOCARDIOGRAM: 12/8/2023-reviewed  Interpretation Summary     1.Left ventricular function is decreased. The ejection fraction is 45-50%  (mildly reduced).  2.There is mild concentric left ventricular hypertrophy.  3.Normal right ventricle size and systolic function.  4.There is mild mitral annular calcification. There is moderate (2+) mitral  regurgitation. ERO is 0.07 cm2 with a volume of 11 mL.  5.Ascending Aorta dilatation is present, moderate  Compared to the prior study dated 2/17/2021, the LV EF is slightly lower.     Physical Examination Review of Systems   /56 (BP Location: Left arm, Patient Position: Sitting, Cuff Size: Adult Large)   Pulse 68   Resp 16   Ht 1.6 m (5' 3\")   Wt 82.6 kg (182 lb 1.6 oz)   BMI 32.26 kg/m    Body mass index is 32.26 kg/m .  Wt Readings from Last 3 Encounters:   01/22/24 82.6 kg (182 lb 1.6 oz)   11/16/23 82.6 kg (182 lb 3.2 oz)   02/17/21 82.1 kg (181 lb)       General Appearance:   no acute distress   ENT/Mouth: No abnormalities   EYES:  no scleral icterus, normal conjunctivae   Neck: no thyromegaly   Chest/Lungs:   lungs are clear to auscultation, no rales or wheezing, equal chest wall expansion    Cardiovascular:   Regular. Normal first and second heart sounds with no murmurs, rubs, or gallops, no edema bilaterally    Abdomen:  bowel sounds are present   Extremities: no cyanosis or clubbing   Skin: warm   Neurologic: no tremors     Psychiatric: alert and oriented x3    Enc Vitals  BP: 104/56  Pulse: 68  Resp: 16  Weight: 82.6 kg (182 lb 1.6 oz)  Height: 160 cm (5' 3\")                                         Medical History  Surgical History Family History Social History   Past Medical History:   Diagnosis Date "    Bronchitis     Cardiomyopathy     Chronic systolic heart failure (H) 05/05/2015    Congestive heart failure (H)     Diabetes     Diverticulosis     Fibrocystic disease of breast     HFrEF (heart failure with reduced ejection fraction) (H) 01/22/2024    HTN (hypertension)     Hyperlipidemia     Non-ischemic cardiomyopathy (H) 05/05/2015    Obese     History reviewed. No pertinent surgical history. Family History   Problem Relation Age of Onset    C.A.D. Maternal Grandfather     C.A.D. Paternal Grandfather     Breast Cancer Sister     Cancer Sister     Cancer - colorectal Mother     Cancer Mother     Prostate Cancer Father     Cancer Father     Social History     Socioeconomic History    Marital status:      Spouse name: Not on file    Number of children: Not on file    Years of education: Not on file    Highest education level: Not on file   Occupational History    Not on file   Tobacco Use    Smoking status: Never    Smokeless tobacco: Never   Vaping Use    Vaping Use: Never used   Substance and Sexual Activity    Alcohol use: No    Drug use: No    Sexual activity: Not Currently     Partners: Male   Other Topics Concern    Parent/sibling w/ CABG, MI or angioplasty before 65F 55M? Not Asked   Social History Narrative    Not on file     Social Determinants of Health     Financial Resource Strain: Not on file   Food Insecurity: Not on file   Transportation Needs: Not on file   Physical Activity: Not on file   Stress: Not on file   Social Connections: Not on file   Interpersonal Safety: Not on file   Housing Stability: Not on file          Medications  Allergies   Current Outpatient Medications   Medication Sig Dispense Refill    aspirin (ASPIR-81) 81 MG EC tablet Take 81 mg by mouth daily.      atorvastatin (LIPITOR) 40 MG tablet Take 40 mg by mouth daily at 2 pm      carvedilol (COREG) 25 MG tablet Take 1 tablet by mouth 2 times daily (with meals). 180 tablet 3    cholecalciferol (VITAMIN D) 1000 UNIT tablet  Take 1 tablet by mouth daily.      dapagliflozin (FARXIGA) 10 MG TABS tablet Take 10 mg by mouth daily      fluocinolone acetonide oil 0.01 % ear drops Place 5 drops into both ears 2 times daily      glipiZIDE (GLUCOTROL XL) 10 MG 24 hr tablet Take 10 mg by mouth 2 times daily (before meals)      hydrOXYzine (ATARAX) 25 MG tablet TAKE 1 TABLET BY MOUTH AT BEDTIME AS NEEDED FOR ANXIETY OR SLEEP      JANUVIA 100 MG tablet Take 100 mg by mouth daily at 2 pm      losartan (COZAAR) 50 MG tablet Take 50 mg by mouth daily.      MetFORmin (GLUCOPHAGE) 500 MG tablet Take 1,000 mg by mouth 2 times daily (with meals) Reported on 3/22/2017      Minoxidil, [id number], (ROGAINE EX) Externally apply  topically. daily      Multiple Vitamin (MULTIVITAMIN) per tablet Take 1 tablet by mouth daily.      Multiple Vitamins-Minerals (PRESERVISION AREDS 2 PO) Take by mouth daily      Allergies   Allergen Reactions    Prozac [Fluoxetine] Other (See Comments)    Lisinopril Other (See Comments)     cough    Sertraline Anxiety         Lab Results    Chemistry/lipid CBC Cardiac Enzymes/BNP/TSH/INR   Lab Results   Component Value Date    CHOL 147 02/17/2021    HDL 52 02/17/2021    TRIG 150 (H) 02/17/2021    BUN 15 02/17/2021     02/17/2021    CO2 27 02/17/2021    Lab Results   Component Value Date    WBC 8.4 01/07/2013    HGB 12.4 01/07/2013    HCT 37.7 01/07/2013    MCV 87 01/07/2013     01/07/2013    Lab Results   Component Value Date    TSH 2.77 01/25/2011             This note has been dictated using voice recognition software. Any grammatical, typographical, or context distortions are unintentional and inherent to the software    40 minutes spent on the date of encounter doing chart review, review of outside records, review of test results, interpretation with above tests, patient visit, and documentation.                      Thank you for allowing me to participate in the care of your patient.      Sincerely,     Sarah  DENISE Prakash CNP     [facility]  cc:   No referring provider defined for this encounter.

## 2024-03-17 ENCOUNTER — HEALTH MAINTENANCE LETTER (OUTPATIENT)
Age: 79
End: 2024-03-17

## 2024-04-02 ENCOUNTER — HOSPITAL ENCOUNTER (OUTPATIENT)
Dept: NUTRITION | Facility: CLINIC | Age: 79
Discharge: HOME | End: 2024-04-02
Attending: NURSE PRACTITIONER | Admitting: NURSE PRACTITIONER
Payer: MEDICARE

## 2024-04-02 DIAGNOSIS — I50.20 HFREF (HEART FAILURE WITH REDUCED EJECTION FRACTION) (H): ICD-10-CM

## 2024-04-02 DIAGNOSIS — E08.69 DIABETES MELLITUS DUE TO UNDERLYING CONDITION WITH OTHER SPECIFIED COMPLICATION, WITHOUT LONG-TERM CURRENT USE OF INSULIN (H): ICD-10-CM

## 2024-04-02 DIAGNOSIS — E66.09 CLASS 1 OBESITY DUE TO EXCESS CALORIES WITH SERIOUS COMORBIDITY AND BODY MASS INDEX (BMI) OF 32.0 TO 32.9 IN ADULT: ICD-10-CM

## 2024-04-02 DIAGNOSIS — E66.811 CLASS 1 OBESITY DUE TO EXCESS CALORIES WITH SERIOUS COMORBIDITY AND BODY MASS INDEX (BMI) OF 32.0 TO 32.9 IN ADULT: ICD-10-CM

## 2024-04-02 PROCEDURE — 97802 MEDICAL NUTRITION INDIV IN: CPT | Mod: GT,95

## 2024-04-02 NOTE — PROGRESS NOTES
"OUTPATIENT CLINICAL NUTRITION SERVICES ASSESSMENT    Video-Visit Details     Type of service: Video Visit     Video Start Time (time video started): 11:00 am     Video End Time (time video stopped): 11:27 am    Originating Location (pt. Location): Home      Distant Location (provider location): On-site     Mode of Communication: Video Conference via Infusion Resource    REASON FOR ASSESSMENT  Afsaneh Hernandez referred by Sarah Fallon APRN CNP for MNT related to  HFrEF (heart failure with reduced ejection fraction) (H) [I50.20]  - Primary  Class 1 obesity due to excess calories with serious comorbidity and body mass index (BMI) of 32.0 to 32.9 in adult [E66.09, Z68.32]  Diabetes mellitus due to underlying condition with other specified complication, without long-term current use of insulin (H) [E08.69]    Weight Loss - Overweight/Obesity    Patient accompanied by: N/A    ASSESSMENT   -PMH: HTN, hyperlipidemia, DM, diverticulosis, fibrocystic disease of breast, non-ischemic cardiomyopathy, chronic HF    What brings you to our session today? Have you met with an RD in the past?  Reports meeting with a dietitian a long time ago. Is meeting because is very serious about losing weight. Only has one time in the past with the support of previous dietitian. Appears to be looking for accountability with this. Seems to \"know\" what to do, is wanting support with putting it into practice.     Nutritional Details:   -Food allergies: none  -Food sensitivities: none  -GI concerns: bloating - goes away   -Appetite: pretty good - stressed, then eats more   -Pace of eating: very slow   -Role of cooking: self  -Role of food shopping: self      Diet Recall:  Breakfast: yogurt, toast. Drinks tea all day (green jessica)    Lunch: poached egg, something similar ex. egg sandwich  Dinner: depends - batch of clifton steen, something \"substantial\"  Snack: likes to eat at night. Has been working on having healthier snacks - " "makes own trail mix - nuts, coconut, etc. Fruit a lot - Cuties, apples, grapes  Beverages: coffee if has work to do, flavored water with ice   Dining out: 4 times - month vs. week (?) - depends on what in the mood for, goes out with  who is in memory care    *one piece of toast, not 2   *poached eggs with snacks  *dinners - frozen dinners to control portion size more  *less trailmix and a Cutie, less chocolate   *vegetables - celery or carrot sticks    Physical Activity:  Days per week: N/A  Duration: N/A  Activity type: [place] seven levels[place] [facility], plays games and may exercise  Limitations: N/A  *pt reports issues with heart and wanting to exercise more to support health    NUTRITION FOCUSED PHYSICAL ASSESSMENT (NFPA) FOR DIAGNOSING MALNUTRITION  No: unable to adequately assess due to video visit          Observed: unable to adequately assess due to video visit     Obtained from Chart/Interdisciplinary Team: mild edema noted but likely not malnutrition-related    LABS  Labs reviewed - reviewed, low hemoglobin, elevated BG (unsure if fasting) per office visit 4/1/24    MEDICATIONS  Medications reviewed - fosamax, tessalon, blood sugar strips, hydrocortisone cream, lancets, vitamin A, c, E, Zinc, Copper capsule, aspirin, lipitor, coreg, vitamin D3, farxiga, ear drops, glipizide, hydroxyzine, cozaar, metformin, rogaine, multivitamin with minerals, januvia    ANTHROPOMETRICS   Height: 160.5 cm (5' 3.19\")  Weight: 81.6 kg (180 lbs) per office visit 4/1/24  BMI (kg/m2): 31.7  Weight Status: Obesity Grade I BMI 30-34.9  IBW: 116 lbs  ADJ BW: 132 lbs  %IBW: 155  Weight History:   Wt Readings from Last 15 Encounters:   01/22/24 82.6 kg (182 lb 1.6 oz)   11/16/23 82.6 kg (182 lb 3.2 oz)   02/17/21 82.1 kg (181 lb)   04/24/19 83.6 kg (184 lb 4.8 oz)   03/22/17 85.5 kg (188 lb 9.6 oz)   03/09/16 84.6 kg (186 lb 8 oz)   08/26/15 89.3 kg (196 lb 14.4 oz)   02/11/15 86.7 kg (191 lb 3.2 oz)   08/13/14 " 88.5 kg (195 lb)   02/12/14 90.1 kg (198 lb 11.2 oz)   10/09/13 87.9 kg (193 lb 12.8 oz)   01/07/13 87.6 kg (193 lb 1.6 oz)   08/06/12 88.5 kg (195 lb)   02/06/12 88.9 kg (196 lb)   10/03/11 87.7 kg (193 lb 6.4 oz)   Overall stable but limited weight pulled from chart.     Per chart review:   180 lbs on 4/1/24  182 lbs on 1/22/24  185 lbs on 11/7/23  185 lbs on 10/23/24  187 lbs on 5/13/23  184 lbs on 10/26/22    Overall loss noted - unsure if intentional but presumed due to desire for weight management.     UBW? Changes to your weight recently?  180 lbs, pretty stable. Has been that way for ten years. Last weight was 178 lbs (?).    Dosing weight: 60 kg using ADJ BW    ASSESSED NUTRITION NEEDS  Estimated Energy Needs: 1,500-1,800 kcals/day (25-30 Kcal/Kg)  Justification: (obese)  Estimated Protein Needs: 48-60 grams protein/day (0.8-1 g pro/Kg)  Justification: (preservation of lean body mass)  Estimated Fluid Needs: 1,800 mL/day (30 mL/kg)    ASSESSED MALNUTRITION STATUS  % Weight Loss: Weight loss does not meet criteria for malnutrition - if present, presumed intentional due to desire for weight management   % Intake: Decreased intake does not meet criteria for malnutrition - if present, presumed intentional due to desire for weight management   Subcutaneous Fat Loss: Unable to adequately assess  Loss of Muscle Mass: Unable to adequately assess  Fluid Retention: Does not meet criteria    Malnutrition Diagnosis: Unable to determine due to lack of adequate NFPE related to video visit     DIAGNOSIS   Nutrition Diagnosis: Excessive energy intake related to frequent snacking in the evening on high-calorie foods as evidenced by dietary recall and pt report      INTERVENTIONS   Nutrition Prescription: general, healthy nutrition recommendations following a whole foods approach, focusing on MyPlate method for guidance. Importance of consistent CHO intake at each meal, making CHOs 1/4 of the plate. Avoiding inconsistent  intake to prevent swings in BG levels. Focusing on intake of non-starchy vegetables as half of the plate. Ensuring intake of plenty of fruits and vegetables, of a variety of colors and types to promote antioxidant, vitamin/mineral intake. Focusing on lean proteins and avoiding foods that are high in saturated, trans fat. Implementing plenty of plant-based proteins such as nuts, seeds, legumes, and beans. Importance of WGs to promote bowel regularity via insoluble fiber intake. Soluble fiber regularly to help lower cholesterol levels. Focusing on reducing overall refined sugar, high calorie foods. Mindfulness with eating to determine when eating, why, how much.       IMPLEMENTATION   Assessed learning needs and learning preference: N/A  Teaching Method(s) used: Explanation    Nutrition Education (Content):              a)  Discussed: went over pt's usual intake, overall dietary patterns. Discussed changes that pt has made. Pt appears to understand that general healthy eating recommendations are consistent with diabetes management. Came up with plan to add Cutie and a bit of trail mix for snack in evening. Etc.               b)  No handouts provided at visit.     Nutrition Education (Application):              a)  Discussed current eating plans and/or recommended alternative food choices              b)  Patient verbalizes understanding of diet by creating goals that reflect diet recommendations and offering no additional questions or concerns at the end of the meeting     Anticipate great compliance   Stage of Change: action and preparation mostly   Additional: has made great changes, appears open to planning further changes and enlisting accountability / support from RD    GOALS  N/A    FOLLOW UP/MONITORING   Progress towards goals will be monitored and evaluated per protocol and Practice Guidelines    Time Spent with Patient  Approx. 27 minutes    Mikayla Olivas RD, LD  Clinical Dietitian  Office:  [phone]  Weekend pager: [phone]

## 2024-04-16 ENCOUNTER — HOSPITAL ENCOUNTER (OUTPATIENT)
Dept: NUTRITION | Facility: CLINIC | Age: 79
Discharge: HOME | End: 2024-04-16
Attending: FAMILY MEDICINE | Admitting: FAMILY MEDICINE
Payer: MEDICARE

## 2024-04-16 PROCEDURE — 97803 MED NUTRITION INDIV SUBSEQ: CPT | Mod: GT,95

## 2024-04-16 NOTE — PROGRESS NOTES
"OUTPATIENT CLINICAL NUTRITION SERVICES FOLLOW-UP   Video-Visit Details     Type of service: Video Visit     Video Start Time (time video started): 10:01 am     Video End Time (time video stopped): 10:14 am    Originating Location (pt. Location): Home      Distant Location (provider location): On-site     Mode of Communication: Video Conference via [employer]     REASON FOR ASSESSMENT  Afsaneh Hernandez referred by Sarah Fallon APRN CNP for MNT related to  HFrEF (heart failure with reduced ejection fraction) (H) [I50.20]  - Primary  Class 1 obesity due to excess calories with serious comorbidity and body mass index (BMI) of 32.0 to 32.9 in adult [E66.09, Z68.32]  Diabetes mellitus due to underlying condition with other specified complication, without long-term current use of insulin (H) [E08.69]     Weight Loss - Overweight/Obesity     Patient accompanied by: N/A    NEW FINDINGS:   Reports that life has been stressful lately - has a close friend that is not doing well health-wise. This appears to really be impacting pt. Has been trying to work on eating \"better\" but hasn't been \"perfect.\" Reports going back to old ways a bit.     Weighs self every morning - knows that weight loss would be helpful. Was consistently 182 lbs (?), went down to 177 lbs and then back up a bit to 178 lbs. Feels a bit discouraged that the weight went back up.    Feels it is good to have accountability - reports does really well when is accountable to someone.     When pt is starting to feel full, has been practicing leave food on plate    Snacks in the evening: trying to incorporate fruit, still having trailmix, did have popcorn recently. Feels it is moreso the amount that pt feels is unhealthy, not necessarily the snack choices themselves.     Wants to start exercising more. [facility] which feels is good for the brain. Would like to do something good for body as well - will see if they have " "classes on Tuesday morning that can go to. Has to be there at a certain time, accountability. 40 minute sessions.     Previous Nutrition Goals:  N/A     Previous Nutrition Follow Up / Monitoring:  Progress towards goals will be monitored and evaluated per protocol and Practice Guidelines    Previous Nutrition Diagnosis:  Excessive energy intake related to frequent snacking in the evening on high-calorie foods as evidenced by dietary recall and pt report    Evaluation: ongoing, progressing slowly     Newest Food Record  N/A - please see above for details.       Physical Activity:  Days per week: N/A  Duration: N/A  Activity type: house has seven levels, [facility], plays games and may exercise  Limitations: N/A  *pt reports issues with heart and wanting to exercise more to support health  *did not discuss unless pt noted. Pt would like to increase movement.     ANTHROPOMETRICS   Height: 160.5 cm (5' 3.19\")  Weight: 81.6 kg (180 lbs) per office visit 4/1/24  BMI (kg/m2): 31.7  Weight Status: Obesity Grade I BMI 30-34.9  IBW: 116 lbs  ADJ BW: 132 lbs  %IBW: 155  Weight History:       Wt Readings from Last 15 Encounters:   01/22/24 82.6 kg (182 lb 1.6 oz)   11/16/23 82.6 kg (182 lb 3.2 oz)   02/17/21 82.1 kg (181 lb)   04/24/19 83.6 kg (184 lb 4.8 oz)   03/22/17 85.5 kg (188 lb 9.6 oz)   03/09/16 84.6 kg (186 lb 8 oz)   08/26/15 89.3 kg (196 lb 14.4 oz)   02/11/15 86.7 kg (191 lb 3.2 oz)   08/13/14 88.5 kg (195 lb)   02/12/14 90.1 kg (198 lb 11.2 oz)   10/09/13 87.9 kg (193 lb 12.8 oz)   01/07/13 87.6 kg (193 lb 1.6 oz)   08/06/12 88.5 kg (195 lb)   02/06/12 88.9 kg (196 lb)   10/03/11 87.7 kg (193 lb 6.4 oz)   Overall stable but limited weight pulled from chart.      Per chart review:   180 lbs on 4/1/24  182 lbs on 1/22/24  185 lbs on 11/7/23  185 lbs on 10/23/24  187 lbs on 5/13/23  184 lbs on 10/26/22     Overall loss noted - unsure if intentional but presumed due to desire for weight management.    "   UBW? Changes to your weight recently?  180 lbs, pretty stable. Has been that way for ten years. Last weight was 178 lbs (?).    No updated weight per chart review at follow-up. Did not discuss with pt unless pt noted. *updated weight per pt is 178 lbs.     Dosing weight: 60 kg using ADJ BW     ASSESSED NUTRITION NEEDS  Estimated Energy Needs: 1,500-1,800 kcals/day (25-30 Kcal/Kg)  Justification: (obese)  Estimated Protein Needs: 48-60 grams protein/day (0.8-1 g pro/Kg)  Justification: (preservation of lean body mass)  Estimated Fluid Needs: 1,800 mL/day (30 mL/kg)    DIAGNOSIS   Nutrition Diagnosis: Excessive energy intake related to frequent snacking in the evening on high-calorie foods as evidenced by dietary recall and pt report      INTERVENTIONS   Nutrition Prescription: general, healthy nutrition recommendations following a whole foods approach, focusing on MyPlate method for guidance. Importance of consistent CHO intake at each meal, making CHOs 1/4 of the plate. Avoiding inconsistent intake to prevent swings in BG levels. Focusing on intake of non-starchy vegetables as half of the plate. Ensuring intake of plenty of fruits and vegetables, of a variety of colors and types to promote antioxidant, vitamin/mineral intake. Focusing on lean proteins and avoiding foods that are high in saturated, trans fat. Implementing plenty of plant-based proteins such as nuts, seeds, legumes, and beans. Importance of WGs to promote bowel regularity via insoluble fiber intake. Soluble fiber regularly to help lower cholesterol levels. Focusing on reducing overall refined sugar, high calorie foods. Mindfulness with eating to determine when eating, why, how much.       IMPLEMENTATION   Assessed learning needs and learning preference: N/A  Teaching Method(s) used: Explanation    Nutrition Education (Content):              a)  Discussed: went over how pt has been doing since last visit. Discussed pt's desire for health, validated  "pt's efforts. Discussed scale fluctuations, importance of overall trend. Pt would like to exercise more. Reports understanding \"what\" to do nutrition wise, to manage diabetes. Is appreciative of accountability with implementation of behavior change. Etc.               b)  No handouts provided at follow-up visit.     Nutrition Education (Application):              a)  Discussed current eating plans and/or recommended alternative food choices              b)  Patient verbalizes understanding of diet by creating goals that reflect diet recommendations and offering no additional questions or concerns at the end of the meeting     Anticipate great compliance   Stage of Change: action and preparation mostly   Additional: pt is working on continued action with changes, is planning to exercise     GOALS  N/A    FOLLOW UP/MONITORING   Progress towards goals will be monitored and evaluated per protocol and Practice Guidelines    Time Spent with Patient  Approx. 13 minutes    Mikayla Olivas RD, LD  Clinical Dietitian  Office: [phone]  Weekend pager: [phone]  "

## 2024-05-26 ENCOUNTER — HEALTH MAINTENANCE LETTER (OUTPATIENT)
Age: 79
End: 2024-05-26

## 2024-06-10 ENCOUNTER — VIRTUAL VISIT (OUTPATIENT)
Dept: EDUCATION SERVICES | Facility: CLINIC | Age: 79
End: 2024-06-10
Payer: COMMERCIAL

## 2024-06-10 DIAGNOSIS — I10 PRIMARY HYPERTENSION: ICD-10-CM

## 2024-06-10 DIAGNOSIS — E11.9 TYPE 2 DIABETES MELLITUS WITHOUT COMPLICATION, WITHOUT LONG-TERM CURRENT USE OF INSULIN (H): Primary | ICD-10-CM

## 2024-06-10 DIAGNOSIS — I50.22 CHRONIC SYSTOLIC HEART FAILURE (H): ICD-10-CM

## 2024-06-10 PROCEDURE — 97802 MEDICAL NUTRITION INDIV IN: CPT | Mod: 95 | Performed by: DIETITIAN, REGISTERED

## 2024-06-10 NOTE — PATIENT INSTRUCTIONS
Goals:  Practice healthy stress management and mindful eating - think are you physically hungry or are you bored, stressed, emotional etc, make of list of things to do besides eat.    Try to get good quality sleep with a goal of 7-8 hours per night.  Stay physically active daily.  Recommend working up to a total of 30 minutes on 5 days/ week.  Recommend a fitness tracker.     Eat in a healthy way- eliminate trans fats, limit saturated fats and added sugars; follow the plate method - picture above.           https://www.heart.org/en/health-topics/heart-failure/heart-failure-tools-resources      https://www.heart.org/-/media/Files/Professional/Quality-Improvement/Get-With-the-Guidelines/Get-With-The-Guidelines-HF/Educational-Materials/CC70023_MSX_Lrvxxqp-Qjnmoirzl-Hmazwv_2882.pdf

## 2024-06-10 NOTE — LETTER
6/10/2024         RE: Afsaneh Hernandez  [address] [address]        Dear Colleague,    Thank you for referring your patient, Afsaneh Hernandez, to the [facility]. Please see a copy of my visit note below.    Medical Nutrition Therapy for Diabetes  Visit Type:Initial assessment and intervention    Afsaneh Hernandez presents today for MNT and education related to Type 2 diabetes, heart failure with reduced ejection fraction, hypertension, hyperlipidemia   Type of service:  Video Visit    If the video visit is dropped, the video visit invitation should be resent by: Text to cell phone:     Originating Location (pt. Location): Home  Distant Location (provider location): [facility]  Mode of Communication:  Video Conference via [employer]    Video Start Time:  2:00  Video End Time (time video stopped): 2:45    How would patient like to obtain AVS? MyChart    ASSESSMENT:   Patient comments/concerns relating to nutrition: Patient feels her diabetes is quite well-controlled.  Decreasing A1c    Patient would like additional ideas to help with controlling weight and heart failure.  NUTRITION HISTORY:  AM patient drinks green tea a small amount of milk and Splenda, sourdough piece of toast with peanut butter and butter    Going to eat about 3 days/week with her significant other.  Patient typically takes home half of the meal and will have that for the evening meal.  Patient does most of her meat from highly processed with the exception of rivas that she likes to have once in a while.  Patient does add salt to her foods frequently and will add seasoning salts to meats.  Extensive education today on low-sodium dietary guidelines for heart failure and hypertension.  At bedtime snacking while watching TV high-calorie trial mix  Misses meals?  No    Food allergies/intolerances: No known food allergies    Diet is high in: sodium  Diet is low in: fiber and  "vegetables    EXERCISE: would like to see if she could qualify for a rehab for CHF.  Per patient's insurance she only qualifies if admitted to the hospital.  Patient will be seeing cardiologist in the near future.  Patient does like to stay very active and would like to go to exercise more patient does go to the [facility] once a week  SOCIO/ECONOMIC:   Lives with: self and dog, significant other in care facility    BLOOD GLUCOSE MONITORING:  Patient's most recent No results found for: \"A1C\" is meeting goal of <8.0    MEDICATIONS:  Current Outpatient Medications   Medication Sig Dispense Refill     dapagliflozin (FARXIGA) 10 MG TABS tablet Take 10 mg by mouth daily       glipiZIDE (GLUCOTROL XL) 10 MG 24 hr tablet Take 10 mg by mouth 2 times daily (before meals)       JANUVIA 100 MG tablet Take 100 mg by mouth daily at 2 pm       MetFORmin (GLUCOPHAGE) 500 MG tablet Take 1,000 mg by mouth 2 times daily (with meals) Reported on 3/22/2017       aspirin (ASPIR-81) 81 MG EC tablet Take 81 mg by mouth daily.       atorvastatin (LIPITOR) 40 MG tablet Take 40 mg by mouth daily at 2 pm       carvedilol (COREG) 25 MG tablet Take 1 tablet by mouth 2 times daily (with meals). 180 tablet 3     cholecalciferol (VITAMIN D) 1000 UNIT tablet Take 1 tablet by mouth daily.       fluocinolone acetonide oil 0.01 % ear drops Place 5 drops into both ears 2 times daily       hydrOXYzine (ATARAX) 25 MG tablet TAKE 1 TABLET BY MOUTH AT BEDTIME AS NEEDED FOR ANXIETY OR SLEEP       losartan (COZAAR) 50 MG tablet Take 50 mg by mouth daily.       Minoxidil, [id number], (ROGAINE EX) Externally apply  topically. daily       Multiple Vitamin (MULTIVITAMIN) per tablet Take 1 tablet by mouth daily.       Multiple Vitamins-Minerals (PRESERVISION AREDS 2 PO) Take by mouth daily       No current facility-administered medications for this visit.       LABS:  No results found for: \"A1C\"  Lab Results   Component Value Date     02/17/2021     Lab Results " "  Component Value Date    LDL 65 02/17/2021     HDL Cholesterol   Date Value Ref Range Status   02/17/2021 52 >49 mg/dL Final   ]  GFR Estimate   Date Value Ref Range Status   02/17/2021 90 >60 mL/min/[1.73_m2] Final     Comment:     Non  GFR Calc  Starting 12/18/2018, serum creatinine based estimated GFR (eGFR) will be   calculated using the Chronic Kidney Disease Epidemiology Collaboration   (CKD-EPI) equation.       Lab Results   Component Value Date    CR 0.56 02/17/2021     No results found for: \"MICROALBUMIN\"    ANTHROPOMETRICS:  Vitals: There were no vitals taken for this visit.  There is no height or weight on file to calculate BMI.      Wt Readings from Last 5 Encounters:   01/22/24 82.6 kg (182 lb 1.6 oz)   11/16/23 82.6 kg (182 lb 3.2 oz)   02/17/21 82.1 kg (181 lb)   04/24/19 83.6 kg (184 lb 4.8 oz)   03/22/17 85.5 kg (188 lb 9.6 oz)       Weight Change: Decreasing with Lasix and dietary changes  NUTRITION DIAGNOSIS: Food- and nutrition-related knowledge deficit related to diabetes, heart failure, hypertension as evidenced by lab values    NUTRITION INTERVENTION:  Nutrition Prescription: Sodium Intake: 2000mg/day  Carbohydrate controlled meals using diabetic plate method   Education given to support: weight reduction, sodium, behavior modification, portion control, and carbohydrate controlled meal planning  Education Materials Provided:    www.heart.org  Motivational Interviewing    PATIENT'S BEHAVIOR CHANGE GOALS:   See Patient Instructions for patient stated behavior change goals. AVS was printed and given to patient at today's appointment.    MONITOR / EVALUATE:  RD will monitor/evaluate:  Blood Glucose / A1c  Pertinent Labs  Weight change    FOLLOW-UP:  Follow-up appointment scheduled on 7/23/2024.   Time spent in minutes: 45  Encounter: Individual      "

## 2024-06-10 NOTE — PROGRESS NOTES
Medical Nutrition Therapy for Diabetes  Visit Type:Initial assessment and intervention    Afsaneh Hernandez presents today for MNT and education related to Type 2 diabetes, heart failure with reduced ejection fraction, hypertension, hyperlipidemia   Type of service:  Video Visit    If the video visit is dropped, the video visit invitation should be resent by: Text to cell phone:     Originating Location (pt. Location): Home  Distant Location (provider location): [facility]  Mode of Communication:  Video Conference via [employer]    Video Start Time:  2:00  Video End Time (time video stopped): 2:45    How would patient like to obtain AVS? MyChart    ASSESSMENT:   Patient comments/concerns relating to nutrition: Patient feels her diabetes is quite well-controlled.  Decreasing A1c    Patient would like additional ideas to help with controlling weight and heart failure.  NUTRITION HISTORY:  AM patient drinks green tea a small amount of milk and Splenda, sourdough piece of toast with peanut butter and butter    Going to eat about 3 days/week with her significant other.  Patient typically takes home half of the meal and will have that for the evening meal.  Patient does most of her meat from highly processed with the exception of rivas that she likes to have once in a while.  Patient does add salt to her foods frequently and will add seasoning salts to meats.  Extensive education today on low-sodium dietary guidelines for heart failure and hypertension.  At bedtime snacking while watching TV high-calorie trial mix  Misses meals?  No    Food allergies/intolerances: No known food allergies    Diet is high in: sodium  Diet is low in: fiber and vegetables    EXERCISE: would like to see if she could qualify for a rehab for CHF.  Per patient's insurance she only qualifies if admitted to the hospital.  Patient will be seeing cardiologist in the near future.  Patient does like to stay very active and  "would like to go to exercise more patient does go to the [facility] once a week  SOCIO/ECONOMIC:   Lives with: self and dog, significant other in care facility    BLOOD GLUCOSE MONITORING:  Patient's most recent No results found for: \"A1C\" is meeting goal of <8.0    MEDICATIONS:  Current Outpatient Medications   Medication Sig Dispense Refill    dapagliflozin (FARXIGA) 10 MG TABS tablet Take 10 mg by mouth daily      glipiZIDE (GLUCOTROL XL) 10 MG 24 hr tablet Take 10 mg by mouth 2 times daily (before meals)      JANUVIA 100 MG tablet Take 100 mg by mouth daily at 2 pm      MetFORmin (GLUCOPHAGE) 500 MG tablet Take 1,000 mg by mouth 2 times daily (with meals) Reported on 3/22/2017      aspirin (ASPIR-81) 81 MG EC tablet Take 81 mg by mouth daily.      atorvastatin (LIPITOR) 40 MG tablet Take 40 mg by mouth daily at 2 pm      carvedilol (COREG) 25 MG tablet Take 1 tablet by mouth 2 times daily (with meals). 180 tablet 3    cholecalciferol (VITAMIN D) 1000 UNIT tablet Take 1 tablet by mouth daily.      fluocinolone acetonide oil 0.01 % ear drops Place 5 drops into both ears 2 times daily      hydrOXYzine (ATARAX) 25 MG tablet TAKE 1 TABLET BY MOUTH AT BEDTIME AS NEEDED FOR ANXIETY OR SLEEP      losartan (COZAAR) 50 MG tablet Take 50 mg by mouth daily.      Minoxidil, [id number], (ROGAINE EX) Externally apply  topically. daily      Multiple Vitamin (MULTIVITAMIN) per tablet Take 1 tablet by mouth daily.      Multiple Vitamins-Minerals (PRESERVISION AREDS 2 PO) Take by mouth daily       No current facility-administered medications for this visit.       LABS:  No results found for: \"A1C\"  Lab Results   Component Value Date     02/17/2021     Lab Results   Component Value Date    LDL 65 02/17/2021     HDL Cholesterol   Date Value Ref Range Status   02/17/2021 52 >49 mg/dL Final   ]  GFR Estimate   Date Value Ref Range Status   02/17/2021 90 >60 mL/min/[1.73_m2] Final     Comment:     Non  GFR " "Calc  Starting 12/18/2018, serum creatinine based estimated GFR (eGFR) will be   calculated using the Chronic Kidney Disease Epidemiology Collaboration   (CKD-EPI) equation.       Lab Results   Component Value Date    CR 0.56 02/17/2021     No results found for: \"MICROALBUMIN\"    ANTHROPOMETRICS:  Vitals: There were no vitals taken for this visit.  There is no height or weight on file to calculate BMI.      Wt Readings from Last 5 Encounters:   01/22/24 82.6 kg (182 lb 1.6 oz)   11/16/23 82.6 kg (182 lb 3.2 oz)   02/17/21 82.1 kg (181 lb)   04/24/19 83.6 kg (184 lb 4.8 oz)   03/22/17 85.5 kg (188 lb 9.6 oz)       Weight Change: Decreasing with Lasix and dietary changes  NUTRITION DIAGNOSIS: Food- and nutrition-related knowledge deficit related to diabetes, heart failure, hypertension as evidenced by lab values    NUTRITION INTERVENTION:  Nutrition Prescription: Sodium Intake: 2000mg/day  Carbohydrate controlled meals using diabetic plate method   Education given to support: weight reduction, sodium, behavior modification, portion control, and carbohydrate controlled meal planning  Education Materials Provided:    www.heart.org  Motivational Interviewing    PATIENT'S BEHAVIOR CHANGE GOALS:   See Patient Instructions for patient stated behavior change goals. AVS was printed and given to patient at today's appointment.    MONITOR / EVALUATE:  RD will monitor/evaluate:  Blood Glucose / A1c  Pertinent Labs  Weight change    FOLLOW-UP:  Follow-up appointment scheduled on 7/23/2024.   Time spent in minutes: 45  Encounter: Individual  "

## 2024-06-19 ENCOUNTER — TELEPHONE (OUTPATIENT)
Dept: EDUCATION SERVICES | Facility: CLINIC | Age: 79
End: 2024-06-19
Payer: COMMERCIAL

## 2024-09-03 ENCOUNTER — VIRTUAL VISIT (OUTPATIENT)
Dept: EDUCATION SERVICES | Facility: CLINIC | Age: 79
End: 2024-09-03
Payer: COMMERCIAL

## 2024-09-03 DIAGNOSIS — I50.22 CHRONIC SYSTOLIC HEART FAILURE (H): ICD-10-CM

## 2024-09-03 DIAGNOSIS — E11.9 TYPE 2 DIABETES MELLITUS WITHOUT COMPLICATION, WITHOUT LONG-TERM CURRENT USE OF INSULIN (H): Primary | ICD-10-CM

## 2024-09-03 DIAGNOSIS — I10 PRIMARY HYPERTENSION: ICD-10-CM

## 2024-09-03 PROCEDURE — 99207 PR DROP WITH A PROCEDURE: CPT | Mod: 95 | Performed by: DIETITIAN, REGISTERED

## 2024-09-03 PROCEDURE — G0108 DIAB MANAGE TRN  PER INDIV: HCPCS | Mod: 95 | Performed by: DIETITIAN, REGISTERED

## 2024-09-03 NOTE — LETTER
"    9/3/2024         RE: Afsaneh Hernandez  [address] [id number]        Dear Colleague,    Thank you for referring your patient, Afsaneh Hernandez, to the [facility]. Please see a copy of my visit note below.    Diabetes Self-Management Education & Support    Presents for: Follow-up type 2 diabetes, hypertension, CHF with reduced ejection fraction    Type of service:  Video Visit     If the video visit is dropped, the video visit invitation should be resent by: Text to cell phone:      Originating Location (pt. Location): Home  Distant Location (provider location): [facility]  Mode of Communication:  Video Conference via [employer]     Video Start Time:  8:00  Video End Time (time video stopped): 8:36     How would patient like to obtain AVS? MyChart      ASSESSMENT:  Patient on call today for ongoing motivational counseling to help maintain glycemic and improve weight control and reduce symptoms of CHF.  Patient states she understands what she should be doing but due to stress patient often gives in to higher sodium foods and is eating for reasons of stress/anxiousness.      Patient is on 4 drug regimen for diabetes control  Januvia 100 mg daily  Glipizide 10 mg twice daily  Metformin 500 mg taking 2 tabs twice daily  Farxiga 10 mg daily    Patient does report reoccurring yeast infections likely related to Farxiga but patient does not believe that is related to Farxiga -patient to discuss further with PCP would recommend discontinuation of Farxiga if ongoing yeast infections.    Patient's most recent No results found for: \"A1C\", \"HEMOGLOBINA1\"  is meeting goal of <8.0  4/1/2024 A1c 7.0  10/23/2023 7.2  4/2023 7.8   Diabetes knowledge and skills assessment:   Patient is knowledgeable in diabetes management concepts related to: Taking Medication and Healthy Coping    Continue education with the following diabetes management concepts: Healthy " Eating, Being Active, Monitoring, Taking Medication, Problem Solving, Reducing Risks, and Healthy Coping    Based on learning assessment above, most appropriate setting for further diabetes education would be: Individual setting.      PLAN  <2000 milligrams sodium per day  Total Carbohydrates (in grams) = Breakfast  30    Lunch  30    Supper  30    If desired snacks 15                                   Blood Glucose testing   Test on 3 days per week (before and 2 hours after one meal)  Before eating goal 80 - 130mg/dL  2 hours after goal 80 - 150mg/dL     Topics to cover at upcoming visits: Healthy Eating, Monitoring, Taking Medication, and Problem Solving    Follow-up: 1 month.  Patient benefits from motivational counseling and prefers frequent visits    See Care Plan for co-developed, patient-state behavior change goals.  AVS provided for patient today.    Education Materials Provided:  Goals for Your Diabetes Care, Carbohydrate Counting, My Plate Planner, and low-sodium guidelines      SUBJECTIVE/OBJECTIVE:  Presents for: Follow-up  Accompanied by: Self  Focus of Visit: Healthy Eating, Reducing Risks  Disease course: Stable  How confident are you filling out medical forms by yourself:: Quite a bit  Transportation concerns: No  Difficulty affording diabetes medication?: No  Difficulty affording diabetes testing supplies?: No  Cultural Influences/Ethnic Background:  Not  or     Diabetes Symptoms & Complications:  Diabetes Related Symptoms: Yeast infection (Likely related to Farxiga -to be discussed with PCP)  Weight trend: Fluctuating (Due to CHF)  Symptom course: Stable  Disease course: Stable  Complications assessed today?: Yes  Autonomic neuropathy: No  CVA: No  Heart disease: Yes  Nephropathy: No  Peripheral neuropathy: No  Peripheral Vascular Disease: No  Retinopathy: No    Patient Problem List and Family Medical History reviewed for relevant medical history, current medical status, and diabetes  "risk factors.    Vitals:  There were no vitals taken for this visit.  Estimated body mass index is 32.26 kg/m  as calculated from the following:    Height as of 1/22/24: 1.6 m (5' 3\").    Weight as of 1/22/24: 82.6 kg (182 lb 1.6 oz).   Last 3 BP:   BP Readings from Last 3 Encounters:   01/22/24 104/56   11/16/23 116/73   02/17/21 106/66       History   Smoking Status     Never   Smokeless Tobacco     Never       Labs:  No results found for: \"A1C\", \"HEMOGLOBINA1\"  Lab Results   Component Value Date     02/17/2021     Lab Results   Component Value Date    LDL 65 02/17/2021     HDL Cholesterol   Date Value Ref Range Status   02/17/2021 52 >49 mg/dL Final   ]  GFR Estimate   Date Value Ref Range Status   02/17/2021 90 >60 mL/min/[1.73_m2] Final     Comment:     Non  GFR Calc  Starting 12/18/2018, serum creatinine based estimated GFR (eGFR) will be   calculated using the Chronic Kidney Disease Epidemiology Collaboration   (CKD-EPI) equation.       GFR Estimate If Black   Date Value Ref Range Status   02/17/2021 >90 >60 mL/min/[1.73_m2] Final     Comment:      GFR Calc  Starting 12/18/2018, serum creatinine based estimated GFR (eGFR) will be   calculated using the Chronic Kidney Disease Epidemiology Collaboration   (CKD-EPI) equation.       Lab Results   Component Value Date    CR 0.56 02/17/2021     No results found for: \"MICROALBUMIN\"    Healthy Eating:  Healthy Eating Assessed Today: Yes  Cultural/Jew diet restrictions?: No  Do you have any food allergies or intolerances?: No  Meal planning/habits: Heart healthy (Patient know she should be following low sodium but does not consistently follow guidelines)  Who cooks/prepares meals for you?: Self  Who purchases food in  your home?: Self  How many times a week on average do you eat food made away from home (restaurant/take-out)?: 3  Breakfast: PB butter toast with Activia  Lunch:  (Eating fast food 3 times per week going out " "with significant other who is in a skilled nursing facility)  Dinner: Trying to have protein every evening occasional vegetable and will have a fruit throughout the day  Snacks: Trail mix, nuts (Difficult time as at bedtime snacking eating for emotional reasons/anxiety)  Beverages: Tea, Water    Being Active:  Being Active Assessed Today: Yes  Exercise:: Currently not exercising  Barrier to exercise: Safety    Monitoring:  Monitoring Assessed Today: Yes  Did patient bring glucose meter to appointment? :  (Patient reports glucose readings \"great\" under 130 in a.m. but glucose was 237 mg/dL during last nonfasting lab -patient to monitor glucose at alternative times besides a.m.)  Times checking blood sugar at home (number): 1 (Reported a.m. glucose readings under 120 mg/dL)  Times checking blood sugar at home (per): Day  Blood glucose trend: No change    Taking Medications:  Diabetes Medication(s)       Biguanides       MetFORmin (GLUCOPHAGE) 500 MG tablet Take 1,000 mg by mouth 2 times daily (with meals) Reported on 3/22/2017       Dipeptidyl Peptidase-4 (DPP-4) Inhibitors       JANUVIA 100 MG tablet Take 100 mg by mouth daily at 2 pm       Sodium-Glucose Co-Transporter 2 (SGLT2) Inhibitors       dapagliflozin (FARXIGA) 10 MG TABS tablet Take 10 mg by mouth daily       Sulfonylureas       glipiZIDE (GLUCOTROL XL) 10 MG 24 hr tablet Take 10 mg by mouth 2 times daily (before meals)            Taking Medication Assessed Today: Yes  Current Treatments: Diet, Oral Medication (taken by mouth)  Problems taking diabetes medications regularly?: No  Diabetes medication side effects?: Yes (Patient reports increased yeast infections likely due to Farxiga but patient reports it is not due to Farxiga and due to diuretic patient to discuss further with provider)    Problem Solving:  Problem Solving Assessed Today: Yes  Is the patient at risk for hypoglycemia?: No  Is the patient at risk for DKA?: No  Does patient have severe " weather/disaster plan for diabetes management?: Yes  Does patient have sick day plan for diabetes management?: Yes              Reducing Risks:  Reducing Risks Assessed Today: Yes  Diabetes Risks: Age over 45 years, Sedentary Lifestyle  CAD Risks: Diabetes Mellitus, Stress, Sedentary lifestyle, Post-menopausal, Obesity, Hypertension  Has dilated eye exam at least once a year?: Yes  Sees dentist every 6 months?: Yes  Feet checked by healthcare provider in the last year?: Yes    Healthy Coping:  Healthy Coping Assessed Today: Yes  Emotional response to diabetes: Acceptance, Confidence diabetes can be controlled  Informal Support system:: Friends  Stage of change: PREPARATION (Decided to change - considering how)  Support resources: Websites, Other  Patient Activation Measure Survey Score:       No data to display                  Care Plan and Education Provided:  Care Plan: Diabetes   Updates made by Elina Sue RD since 9/3/2024 12:00 AM        Problem: HbA1C Not In Goal         Goal: Establish Regular Follow-Ups with PCP         Task: Discuss with PCP the recommended timing for patient's next follow up visit(s)    Responsible User: Elina Sue RD        Task: Discuss schedule for PCP visits with patient Completed 9/3/2024   Responsible User: Elina Sue RD        Goal: Get HbA1C Level in Goal         Task: Educate patient on diabetes education self-management topics    Responsible User: Elina Sue RD        Task: Educate patient on benefits of regular glucose monitoring Completed 9/3/2024   Responsible User: Elina Sue RD        Task: Refer patient to appropriate extended care team member, as needed (Medication Therapy Management, Behavioral Health, Physical Therapy, etc.)    Responsible User: Elina Sue RD        Task: Discuss diabetes treatment plan with patient Completed 9/3/2024   Responsible User: Elina Sue RD        Problem: Diabetes Self-Management Education Needed to  Optimize Self-Care Behaviors         Goal: Understand diabetes pathophysiology and disease progression         Task: Provide education on diabetes pathophysiology and disease progression specfic to patient's diabetes type Completed 9/3/2024   Responsible User: Elina Sue RD        Goal: Healthy Eating - follow a healthy eating pattern for diabetes         Task: Provide education on portion control and consistency in amount, composition and timing of food intake    Responsible User: Elina Sue RD        Task: Provide education on managing carbohydrate intake (carbohydrate counting, plate planning method, etc.)    Responsible User: Elina Sue RD        Task: Provide education on weight management Completed 9/3/2024   Responsible User: Elina Sue RD        Task: Provide education on heart healthy eating Completed 9/3/2024   Responsible User: Elina Sue RD        Task: Provide education on eating out    Responsible User: Elina Sue RD        Task: Develop individualized healthy eating plan with patient    Responsible User: Elina Sue RD        Goal: Being Active - get regular physical activity, working up to at least 150 minutes per week         Task: Provide education on relationship of activity to glucose and precautions to take if at risk for low glucose    Responsible User: Elina Sue RD        Task: Discuss barriers to physical activity with patient Completed 9/3/2024   Responsible User: Elina Sue RD        Task: Develop physical activity plan with patient    Responsible User: Elina Sue RD        Task: Explore community resources including walking groups, assistance programs, and home videos    Responsible User: Elina Sue RD        Goal: Monitoring - monitor glucose and ketones as directed         Task: Provide education on blood glucose monitoring (purpose, proper technique, frequency, glucose targets, interpreting results, when to use glucose control  solution, sharps disposal)    Responsible User: Elina Sue RD        Task: Provide education on continuous glucose monitoring (sensor placement, use of tristen or /reader, understanding glucose trends, alerts and alarms, differences between sensor glucose and blood glucose)    Responsible User: Elina Sue RD        Task: Provide education on ketone monitoring (when to monitor, frequency, etc.)    Responsible User: Elina Sue RD        Goal: Taking Medication - patient is consistently taking medications as directed         Task: Provide education on action of prescribed medication, including when to take and possible side effects Completed 9/3/2024   Responsible User: Elina Sue RD        Task: Provide education on insulin and injectable diabetes medications, including administration, storage, site selection and rotation for injection sites    Responsible User: Elina Sue RD        Task: Discuss barriers to medication adherence with patient and provide management technique ideas as appropriate    Responsible User: Elina Sue RD        Task: Provide education on frequency and refill details of medications    Responsible User: Elina Sue RD        Goal: Problem Solving - know how to prevent and manage short-term diabetes complications         Task: Provide education on high blood glucose - causes, signs/symptoms, prevention and treatment    Responsible User: Elina Sue RD        Task: Provide education on low blood glucose - causes, signs/symptoms, prevention, treatment, carrying a carbohydrate source at all times, and medical identification    Responsible User: Elina Sue RD        Task: Provide education on safe travel with diabetes    Responsible User: Elina Sue RD        Task: Provide education on how to care for diabetes on sick days    Responsible User: Elina Sue RD        Task: Provide education on when to call a health care provider    Responsible  User: Elina Sue RD        Goal: Reducing Risks - know how to prevent and treat long-term diabetes complications    This Visit's Progress: 60%   Note:    Patient to eliminate the use added salt at least 80% of the time       Task: Provide education on major complications of diabetes, prevention, early diagnostic measures and treatment of complications    Responsible User: Elina Sue RD        Task: Provide education on recommended care for dental, eye and foot health    Responsible User: Elina Sue RD        Task: Provide education on Hemoglobin A1c - goals and relationship to blood glucose levels Completed 9/3/2024   Responsible User: Elina Sue RD        Task: Provide education on recommendations for heart health - lipid levels and goals, blood pressure and goals, and aspirin therapy, if indicated Completed 9/3/2024   Responsible User: Elina Sue RD        Task: Provide education on tobacco cessation    Responsible User: Elina Sue RD        Goal: Healthy Coping - use available resources to cope with the challenges of managing diabetes         Task: Discuss recognizing feelings about having diabetes    Responsible User: Elina Sue RD        Task: Provide education on the benefits of making appropriate lifestyle changes    Responsible User: Elina Sue RD        Task: Provide education on benefits of utilizing support systems    Responsible User: Elina Sue RD        Task: Discuss methods for coping with stress Completed 9/3/2024   Responsible User: Elina Sue RD        Task: Provide education on when to seek professional counseling    Responsible User: Elina Sue RD          Time Spent: 30 minutes  Encounter Type: Individual    Any diabetes medication dose changes were made via the CDE Protocol per the patient's referring provider. A copy of this encounter was shared with the provider.

## 2024-09-03 NOTE — PROGRESS NOTES
"Diabetes Self-Management Education & Support    Presents for: Follow-up type 2 diabetes, hypertension, CHF with reduced ejection fraction    Type of service:  Video Visit     If the video visit is dropped, the video visit invitation should be resent by: Text to cell phone:      Originating Location (pt. Location): Home  Distant Location (provider location): [facility] [place]  Mode of Communication:  Video Conference via [employer]     Video Start Time:  8:00  Video End Time (time video stopped): 8:36     How would patient like to obtain AVS? MyChart      ASSESSMENT:  Patient on call today for ongoing motivational counseling to help maintain glycemic and improve weight control and reduce symptoms of CHF.  Patient states she understands what she should be doing but due to stress patient often gives in to higher sodium foods and is eating for reasons of stress/anxiousness.      Patient is on 4 drug regimen for diabetes control  Januvia 100 mg daily  Glipizide 10 mg twice daily  Metformin 500 mg taking 2 tabs twice daily  Farxiga 10 mg daily    Patient does report reoccurring yeast infections likely related to Farxiga but patient does not believe that is related to Farxiga -patient to discuss further with PCP would recommend discontinuation of Farxiga if ongoing yeast infections.    Patient's most recent No results found for: \"A1C\", \"HEMOGLOBINA1\"  is meeting goal of <8.0  4/1/2024 A1c 7.0  10/23/2023 7.2  4/2023 7.8   Diabetes knowledge and skills assessment:   Patient is knowledgeable in diabetes management concepts related to: Taking Medication and Healthy Coping    Continue education with the following diabetes management concepts: Healthy Eating, Being Active, Monitoring, Taking Medication, Problem Solving, Reducing Risks, and Healthy Coping    Based on learning assessment above, most appropriate setting for further diabetes education would be: Individual setting.      PLAN  <2000 milligrams " "sodium per day  Total Carbohydrates (in grams) = Breakfast  30    Lunch  30    Supper  30    If desired snacks 15                                   Blood Glucose testing   Test on 3 days per week (before and 2 hours after one meal)  Before eating goal 80 - 130mg/dL  2 hours after goal 80 - 150mg/dL     Topics to cover at upcoming visits: Healthy Eating, Monitoring, Taking Medication, and Problem Solving    Follow-up: 1 month.  Patient benefits from motivational counseling and prefers frequent visits    See Care Plan for co-developed, patient-state behavior change goals.  AVS provided for patient today.    Education Materials Provided:  Goals for Your Diabetes Care, Carbohydrate Counting, My Plate Planner, and low-sodium guidelines      SUBJECTIVE/OBJECTIVE:  Presents for: Follow-up  Accompanied by: Self  Focus of Visit: Healthy Eating, Reducing Risks  Disease course: Stable  How confident are you filling out medical forms by yourself:: Quite a bit  Transportation concerns: No  Difficulty affording diabetes medication?: No  Difficulty affording diabetes testing supplies?: No  Cultural Influences/Ethnic Background:  Not  or     Diabetes Symptoms & Complications:  Diabetes Related Symptoms: Yeast infection (Likely related to Farxiga -to be discussed with PCP)  Weight trend: Fluctuating (Due to CHF)  Symptom course: Stable  Disease course: Stable  Complications assessed today?: Yes  Autonomic neuropathy: No  CVA: No  Heart disease: Yes  Nephropathy: No  Peripheral neuropathy: No  Peripheral Vascular Disease: No  Retinopathy: No    Patient Problem List and Family Medical History reviewed for relevant medical history, current medical status, and diabetes risk factors.    Vitals:  There were no vitals taken for this visit.  Estimated body mass index is 32.26 kg/m  as calculated from the following:    Height as of 1/22/24: 1.6 m (5' 3\").    Weight as of 1/22/24: 82.6 kg (182 lb 1.6 oz).   Last 3 BP:   BP " "Readings from Last 3 Encounters:   01/22/24 104/56   11/16/23 116/73   02/17/21 106/66       History   Smoking Status    Never   Smokeless Tobacco    Never       Labs:  No results found for: \"A1C\", \"HEMOGLOBINA1\"  Lab Results   Component Value Date     02/17/2021     Lab Results   Component Value Date    LDL 65 02/17/2021     HDL Cholesterol   Date Value Ref Range Status   02/17/2021 52 >49 mg/dL Final   ]  GFR Estimate   Date Value Ref Range Status   02/17/2021 90 >60 mL/min/[1.73_m2] Final     Comment:     Non  GFR Calc  Starting 12/18/2018, serum creatinine based estimated GFR (eGFR) will be   calculated using the Chronic Kidney Disease Epidemiology Collaboration   (CKD-EPI) equation.       GFR Estimate If Black   Date Value Ref Range Status   02/17/2021 >90 >60 mL/min/[1.73_m2] Final     Comment:      GFR Calc  Starting 12/18/2018, serum creatinine based estimated GFR (eGFR) will be   calculated using the Chronic Kidney Disease Epidemiology Collaboration   (CKD-EPI) equation.       Lab Results   Component Value Date    CR 0.56 02/17/2021     No results found for: \"MICROALBUMIN\"    Healthy Eating:  Healthy Eating Assessed Today: Yes  Cultural/Orthodox diet restrictions?: No  Do you have any food allergies or intolerances?: No  Meal planning/habits: Heart healthy (Patient know she should be following low sodium but does not consistently follow guidelines)  Who cooks/prepares meals for you?: Self  Who purchases food in  your home?: Self  How many times a week on average do you eat food made away from home (restaurant/take-out)?: 3  Breakfast: PB butter toast with Activia  Lunch:  (Eating fast food 3 times per week going out with significant other who is in a skilled nursing facility)  Dinner: Trying to have protein every evening occasional vegetable and will have a fruit throughout the day  Snacks: Trail mix, nuts (Difficult time as at bedtime snacking eating for emotional " "reasons/anxiety)  Beverages: Tea, Water    Being Active:  Being Active Assessed Today: Yes  Exercise:: Currently not exercising  Barrier to exercise: Safety    Monitoring:  Monitoring Assessed Today: Yes  Did patient bring glucose meter to appointment? :  (Patient reports glucose readings \"great\" under 130 in a.m. but glucose was 237 mg/dL during last nonfasting lab -patient to monitor glucose at alternative times besides a.m.)  Times checking blood sugar at home (number): 1 (Reported a.m. glucose readings under 120 mg/dL)  Times checking blood sugar at home (per): Day  Blood glucose trend: No change    Taking Medications:  Diabetes Medication(s)       Biguanides       MetFORmin (GLUCOPHAGE) 500 MG tablet Take 1,000 mg by mouth 2 times daily (with meals) Reported on 3/22/2017       Dipeptidyl Peptidase-4 (DPP-4) Inhibitors       JANUVIA 100 MG tablet Take 100 mg by mouth daily at 2 pm       Sodium-Glucose Co-Transporter 2 (SGLT2) Inhibitors       dapagliflozin (FARXIGA) 10 MG TABS tablet Take 10 mg by mouth daily       Sulfonylureas       glipiZIDE (GLUCOTROL XL) 10 MG 24 hr tablet Take 10 mg by mouth 2 times daily (before meals)            Taking Medication Assessed Today: Yes  Current Treatments: Diet, Oral Medication (taken by mouth)  Problems taking diabetes medications regularly?: No  Diabetes medication side effects?: Yes (Patient reports increased yeast infections likely due to Farxiga but patient reports it is not due to Farxiga and due to diuretic patient to discuss further with provider)    Problem Solving:  Problem Solving Assessed Today: Yes  Is the patient at risk for hypoglycemia?: No  Is the patient at risk for DKA?: No  Does patient have severe weather/disaster plan for diabetes management?: Yes  Does patient have sick day plan for diabetes management?: Yes              Reducing Risks:  Reducing Risks Assessed Today: Yes  Diabetes Risks: Age over 45 years, Sedentary Lifestyle  CAD Risks: Diabetes " Mellitus, Stress, Sedentary lifestyle, Post-menopausal, Obesity, Hypertension  Has dilated eye exam at least once a year?: Yes  Sees dentist every 6 months?: Yes  Feet checked by healthcare provider in the last year?: Yes    Healthy Coping:  Healthy Coping Assessed Today: Yes  Emotional response to diabetes: Acceptance, Confidence diabetes can be controlled  Informal Support system:: Friends  Stage of change: PREPARATION (Decided to change - considering how)  Support resources: Websites, Other  Patient Activation Measure Survey Score:       No data to display                  Care Plan and Education Provided:  Care Plan: Diabetes   Updates made by Elina Sue RD since 9/3/2024 12:00 AM        Problem: HbA1C Not In Goal         Goal: Establish Regular Follow-Ups with PCP         Task: Discuss with PCP the recommended timing for patient's next follow up visit(s)    Responsible User: Elina Seu RD        Task: Discuss schedule for PCP visits with patient Completed 9/3/2024   Responsible User: Elina Sue RD        Goal: Get HbA1C Level in Goal         Task: Educate patient on diabetes education self-management topics    Responsible User: Elina Sue RD        Task: Educate patient on benefits of regular glucose monitoring Completed 9/3/2024   Responsible User: Elina Sue RD        Task: Refer patient to appropriate extended care team member, as needed (Medication Therapy Management, Behavioral Health, Physical Therapy, etc.)    Responsible User: Elina Sue RD        Task: Discuss diabetes treatment plan with patient Completed 9/3/2024   Responsible User: Elina Sue RD        Problem: Diabetes Self-Management Education Needed to Optimize Self-Care Behaviors         Goal: Understand diabetes pathophysiology and disease progression         Task: Provide education on diabetes pathophysiology and disease progression specfic to patient's diabetes type Completed 9/3/2024   Responsible User:  Elina Sue RD        Goal: Healthy Eating - follow a healthy eating pattern for diabetes         Task: Provide education on portion control and consistency in amount, composition and timing of food intake    Responsible User: Elina Sue RD        Task: Provide education on managing carbohydrate intake (carbohydrate counting, plate planning method, etc.)    Responsible User: Elina Sue RD        Task: Provide education on weight management Completed 9/3/2024   Responsible User: Elina Sue RD        Task: Provide education on heart healthy eating Completed 9/3/2024   Responsible User: Elina Sue RD        Task: Provide education on eating out    Responsible User: Elina Sue RD        Task: Develop individualized healthy eating plan with patient    Responsible User: Elina Sue RD        Goal: Being Active - get regular physical activity, working up to at least 150 minutes per week         Task: Provide education on relationship of activity to glucose and precautions to take if at risk for low glucose    Responsible User: Elina Sue RD        Task: Discuss barriers to physical activity with patient Completed 9/3/2024   Responsible User: Elina Sue RD        Task: Develop physical activity plan with patient    Responsible User: Elina Sue RD        Task: Explore community resources including walking groups, assistance programs, and home videos    Responsible User: Elina Sue RD        Goal: Monitoring - monitor glucose and ketones as directed         Task: Provide education on blood glucose monitoring (purpose, proper technique, frequency, glucose targets, interpreting results, when to use glucose control solution, sharps disposal)    Responsible User: Elina Sue RD        Task: Provide education on continuous glucose monitoring (sensor placement, use of tristen or /reader, understanding glucose trends, alerts and alarms, differences between sensor glucose  and blood glucose)    Responsible User: Elina Sue RD        Task: Provide education on ketone monitoring (when to monitor, frequency, etc.)    Responsible User: Elina Sue RD        Goal: Taking Medication - patient is consistently taking medications as directed         Task: Provide education on action of prescribed medication, including when to take and possible side effects Completed 9/3/2024   Responsible User: Elina Sue RD        Task: Provide education on insulin and injectable diabetes medications, including administration, storage, site selection and rotation for injection sites    Responsible User: Elina Sue RD        Task: Discuss barriers to medication adherence with patient and provide management technique ideas as appropriate    Responsible User: Elina Sue RD        Task: Provide education on frequency and refill details of medications    Responsible User: Elina Sue RD        Goal: Problem Solving - know how to prevent and manage short-term diabetes complications         Task: Provide education on high blood glucose - causes, signs/symptoms, prevention and treatment    Responsible User: Elina Sue RD        Task: Provide education on low blood glucose - causes, signs/symptoms, prevention, treatment, carrying a carbohydrate source at all times, and medical identification    Responsible User: Elina Sue RD        Task: Provide education on safe travel with diabetes    Responsible User: Elina Sue RD        Task: Provide education on how to care for diabetes on sick days    Responsible User: Elina Sue RD        Task: Provide education on when to call a health care provider    Responsible User: Elina Sue RD        Goal: Reducing Risks - know how to prevent and treat long-term diabetes complications    This Visit's Progress: 60%   Note:    Patient to eliminate the use added salt at least 80% of the time       Task: Provide education on major  complications of diabetes, prevention, early diagnostic measures and treatment of complications    Responsible User: Elina Sue RD        Task: Provide education on recommended care for dental, eye and foot health    Responsible User: Elina Sue RD        Task: Provide education on Hemoglobin A1c - goals and relationship to blood glucose levels Completed 9/3/2024   Responsible User: Elina Sue RD        Task: Provide education on recommendations for heart health - lipid levels and goals, blood pressure and goals, and aspirin therapy, if indicated Completed 9/3/2024   Responsible User: Elina Sue RD        Task: Provide education on tobacco cessation    Responsible User: Elina Sue RD        Goal: Healthy Coping - use available resources to cope with the challenges of managing diabetes         Task: Discuss recognizing feelings about having diabetes    Responsible User: Elina Sue RD        Task: Provide education on the benefits of making appropriate lifestyle changes    Responsible User: Elina Sue RD        Task: Provide education on benefits of utilizing support systems    Responsible User: Elina Sue RD        Task: Discuss methods for coping with stress Completed 9/3/2024   Responsible User: Elina Sue RD        Task: Provide education on when to seek professional counseling    Responsible User: Elina Sue RD          Time Spent: 30 minutes  Encounter Type: Individual    Any diabetes medication dose changes were made via the CDE Protocol per the patient's referring provider. A copy of this encounter was shared with the provider.

## 2024-09-03 NOTE — PATIENT INSTRUCTIONS
Goals:  Practice healthy stress management and mindful eating - think are you physically hungry or are you bored, stressed, emotional etc, make of list of things to do besides eat.    Try to get good quality sleep with a goal of 7-8 hours per night.  Stay physically active daily.  Recommend working up to a total of 30 minutes on 5 days/ week.  Recommend a fitness tracker.     Eat in a healthy way- eliminate trans fats, limit saturated fats and added sugars; follow the plate method - picture above.  Keep a food record (MyFitnessPal, Loseit).    A meal is 3 or more food groups; make it colorful for better nutrition.    Total Carbohydrates (in grams) = Breakfast  30    Lunch  30    Supper  30    If desired snacks 15                                   Blood Glucose testing   Test on 3 days per week (before and 2 hours after one meal)  Before eating goal 80 - 130mg/dL  2 hours after goal 80 - 150mg/dL

## 2024-10-13 ENCOUNTER — HEALTH MAINTENANCE LETTER (OUTPATIENT)
Age: 79
End: 2024-10-13

## 2025-05-03 ENCOUNTER — HEALTH MAINTENANCE LETTER (OUTPATIENT)
Age: 80
End: 2025-05-03